# Patient Record
Sex: MALE | Race: WHITE | NOT HISPANIC OR LATINO | Employment: OTHER | URBAN - METROPOLITAN AREA
[De-identification: names, ages, dates, MRNs, and addresses within clinical notes are randomized per-mention and may not be internally consistent; named-entity substitution may affect disease eponyms.]

---

## 2023-01-31 PROBLEM — I10 ESSENTIAL HYPERTENSION: Status: ACTIVE | Noted: 2023-01-31
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
## 2023-01-31 PROBLEM — G47.00 INSOMNIA: Status: ACTIVE | Noted: 2023-01-31

## 2023-01-31 PROBLEM — J20.9 ACUTE BRONCHITIS: Status: ACTIVE | Noted: 2023-01-31

## 2023-01-31 PROBLEM — M54.16 LUMBAR RADICULITIS: Status: ACTIVE | Noted: 2023-01-31

## 2023-01-31 PROBLEM — Z95.2 S/P AVR (AORTIC VALVE REPLACEMENT): Status: ACTIVE | Noted: 2023-01-31

## 2023-01-31 PROBLEM — R32 URINE INCONTINENCE: Status: ACTIVE | Noted: 2023-01-31

## 2023-01-31 PROBLEM — I48.0 PAROXYSMAL ATRIAL FIBRILLATION (MULTI): Status: ACTIVE | Noted: 2023-01-31

## 2023-01-31 PROBLEM — M70.70 HIP BURSITIS: Status: ACTIVE | Noted: 2023-01-31

## 2023-01-31 PROBLEM — K92.1 BLOOD IN STOOL: Status: ACTIVE | Noted: 2023-01-31

## 2023-01-31 PROBLEM — R60.9 PERIPHERAL EDEMA: Status: ACTIVE | Noted: 2023-01-31

## 2023-01-31 PROBLEM — N17.9 AKI (ACUTE KIDNEY INJURY) (CMS-HCC): Status: ACTIVE | Noted: 2023-01-31

## 2023-01-31 PROBLEM — T84.018A: Status: ACTIVE | Noted: 2023-01-31

## 2023-01-31 PROBLEM — B35.1 ONYCHOMYCOSIS OF TOENAIL: Status: ACTIVE | Noted: 2023-01-31

## 2023-01-31 PROBLEM — F32.4 DEPRESSION, MAJOR, IN PARTIAL REMISSION (CMS-HCC): Status: ACTIVE | Noted: 2023-01-31

## 2023-01-31 PROBLEM — Z96.649: Status: ACTIVE | Noted: 2023-01-31

## 2023-01-31 PROBLEM — R35.1 NOCTURIA: Status: ACTIVE | Noted: 2023-01-31

## 2023-01-31 PROBLEM — L98.9 SKIN LESION OF FACE: Status: ACTIVE | Noted: 2023-01-31

## 2023-01-31 PROBLEM — R80.9 PROTEINURIA: Status: ACTIVE | Noted: 2023-01-31

## 2023-01-31 PROBLEM — R60.0 PERIPHERAL EDEMA: Status: ACTIVE | Noted: 2023-01-31

## 2023-01-31 PROBLEM — Z95.1 S/P CABG X 3: Status: ACTIVE | Noted: 2023-01-31

## 2023-01-31 PROBLEM — E78.5 HYPERLIPIDEMIA: Status: ACTIVE | Noted: 2023-01-31

## 2023-01-31 PROBLEM — F32.A DEPRESSION: Status: ACTIVE | Noted: 2023-01-31

## 2023-01-31 PROBLEM — L40.9 PSORIASIS: Status: ACTIVE | Noted: 2023-01-31

## 2023-01-31 PROBLEM — M48.061 LUMBAR SPINAL STENOSIS: Status: ACTIVE | Noted: 2023-01-31

## 2023-01-31 PROBLEM — R41.82 CHANGE IN MENTAL STATUS: Status: ACTIVE | Noted: 2023-01-31

## 2023-01-31 PROBLEM — R58 RETROPERITONEAL BLEED: Status: ACTIVE | Noted: 2023-01-31

## 2023-01-31 PROBLEM — R53.83 FATIGUE: Status: ACTIVE | Noted: 2023-01-31

## 2023-01-31 PROBLEM — F03.90 DEMENTIA (MULTI): Status: ACTIVE | Noted: 2023-01-31

## 2023-01-31 PROBLEM — B35.6 TINEA CRURIS: Status: ACTIVE | Noted: 2023-01-31

## 2023-01-31 PROBLEM — I25.10 CAD IN NATIVE ARTERY: Status: ACTIVE | Noted: 2023-01-31

## 2023-01-31 PROBLEM — M17.11 RIGHT KNEE DJD: Status: ACTIVE | Noted: 2023-01-31

## 2023-01-31 PROBLEM — M16.10 ARTHRITIS OF HIP: Status: ACTIVE | Noted: 2023-01-31

## 2023-01-31 PROBLEM — I47.10 PSVT (PAROXYSMAL SUPRAVENTRICULAR TACHYCARDIA) (CMS-HCC): Status: ACTIVE | Noted: 2023-01-31

## 2023-01-31 PROBLEM — I42.9 CARDIOMYOPATHY (MULTI): Status: ACTIVE | Noted: 2023-01-31

## 2023-01-31 RX ORDER — GABAPENTIN 600 MG/1
600 TABLET ORAL 2 TIMES DAILY
COMMUNITY
Start: 2016-09-11 | End: 2023-03-14 | Stop reason: SDUPTHER

## 2023-01-31 RX ORDER — CHLORTHALIDONE 25 MG/1
1 TABLET ORAL DAILY
COMMUNITY
Start: 2022-02-18 | End: 2023-06-19 | Stop reason: SDUPTHER

## 2023-01-31 RX ORDER — SERTRALINE HYDROCHLORIDE 100 MG/1
2 TABLET, FILM COATED ORAL DAILY
COMMUNITY
Start: 2013-01-07 | End: 2023-03-23

## 2023-01-31 RX ORDER — NYSTATIN 100000 U/G
CREAM TOPICAL
COMMUNITY
Start: 2022-08-09 | End: 2023-06-06 | Stop reason: ALTCHOICE

## 2023-01-31 RX ORDER — AMLODIPINE BESYLATE 5 MG/1
1 TABLET ORAL DAILY
COMMUNITY
Start: 2022-01-24 | End: 2023-05-01

## 2023-01-31 RX ORDER — TRAZODONE HYDROCHLORIDE 50 MG/1
1 TABLET ORAL NIGHTLY
COMMUNITY
Start: 2021-09-21 | End: 2023-04-13

## 2023-01-31 RX ORDER — TAMSULOSIN HYDROCHLORIDE 0.4 MG/1
1 CAPSULE ORAL DAILY
COMMUNITY
Start: 2022-02-18 | End: 2023-06-07

## 2023-01-31 RX ORDER — VENLAFAXINE HYDROCHLORIDE 75 MG/1
75 CAPSULE, EXTENDED RELEASE ORAL DAILY
COMMUNITY
Start: 2013-01-07 | End: 2023-11-07 | Stop reason: SDUPTHER

## 2023-01-31 RX ORDER — SOTALOL HYDROCHLORIDE 80 MG/1
1 TABLET ORAL 2 TIMES DAILY
COMMUNITY
Start: 2021-09-21 | End: 2023-11-07 | Stop reason: SDUPTHER

## 2023-01-31 RX ORDER — ACETAMINOPHEN 325 MG/1
650 TABLET ORAL EVERY 6 HOURS
COMMUNITY
Start: 2021-09-21

## 2023-01-31 RX ORDER — METOPROLOL SUCCINATE 100 MG/1
1 TABLET, EXTENDED RELEASE ORAL DAILY
COMMUNITY
Start: 2019-01-07 | End: 2023-04-13

## 2023-01-31 RX ORDER — FENOFIBRATE 145 MG/1
1 TABLET, FILM COATED ORAL DAILY
COMMUNITY
Start: 2015-02-05 | End: 2023-04-13

## 2023-01-31 RX ORDER — LISINOPRIL 40 MG/1
1 TABLET ORAL DAILY
COMMUNITY
Start: 2019-03-04 | End: 2023-03-13

## 2023-01-31 RX ORDER — WARFARIN 1 MG/1
TABLET ORAL
COMMUNITY
Start: 2014-03-31 | End: 2023-03-23

## 2023-01-31 RX ORDER — ATORVASTATIN CALCIUM 20 MG/1
1 TABLET, FILM COATED ORAL DAILY
COMMUNITY
Start: 2013-06-29 | End: 2023-04-13

## 2023-03-12 DIAGNOSIS — I10 PRIMARY HYPERTENSION: Primary | ICD-10-CM

## 2023-03-13 RX ORDER — LISINOPRIL 40 MG/1
TABLET ORAL
Qty: 90 TABLET | Refills: 3 | Status: SHIPPED | OUTPATIENT
Start: 2023-03-13 | End: 2024-02-22 | Stop reason: SDUPTHER

## 2023-03-14 ENCOUNTER — OFFICE VISIT (OUTPATIENT)
Dept: PRIMARY CARE | Facility: CLINIC | Age: 78
End: 2023-03-14
Payer: MEDICARE

## 2023-03-14 VITALS
HEIGHT: 68 IN | TEMPERATURE: 98.6 F | DIASTOLIC BLOOD PRESSURE: 74 MMHG | SYSTOLIC BLOOD PRESSURE: 144 MMHG | WEIGHT: 280 LBS | BODY MASS INDEX: 42.44 KG/M2

## 2023-03-14 DIAGNOSIS — Z91.81 AT MODERATE RISK FOR FALL: ICD-10-CM

## 2023-03-14 DIAGNOSIS — E66.01 MORBID OBESITY WITH BMI OF 40.0-44.9, ADULT (MULTI): Primary | ICD-10-CM

## 2023-03-14 DIAGNOSIS — Z00.00 ROUTINE GENERAL MEDICAL EXAMINATION AT HEALTH CARE FACILITY: ICD-10-CM

## 2023-03-14 DIAGNOSIS — M48.062 SPINAL STENOSIS OF LUMBAR REGION WITH NEUROGENIC CLAUDICATION: ICD-10-CM

## 2023-03-14 DIAGNOSIS — M54.16 LUMBAR RADICULITIS: ICD-10-CM

## 2023-03-14 PROCEDURE — 1036F TOBACCO NON-USER: CPT | Performed by: FAMILY MEDICINE

## 2023-03-14 PROCEDURE — 3077F SYST BP >= 140 MM HG: CPT | Performed by: FAMILY MEDICINE

## 2023-03-14 PROCEDURE — 1157F ADVNC CARE PLAN IN RCRD: CPT | Performed by: FAMILY MEDICINE

## 2023-03-14 PROCEDURE — 1170F FXNL STATUS ASSESSED: CPT | Performed by: FAMILY MEDICINE

## 2023-03-14 PROCEDURE — 1160F RVW MEDS BY RX/DR IN RCRD: CPT | Performed by: FAMILY MEDICINE

## 2023-03-14 PROCEDURE — 1159F MED LIST DOCD IN RCRD: CPT | Performed by: FAMILY MEDICINE

## 2023-03-14 PROCEDURE — 3078F DIAST BP <80 MM HG: CPT | Performed by: FAMILY MEDICINE

## 2023-03-14 PROCEDURE — G0439 PPPS, SUBSEQ VISIT: HCPCS | Performed by: FAMILY MEDICINE

## 2023-03-14 RX ORDER — GABAPENTIN 600 MG/1
600 TABLET ORAL 4 TIMES DAILY
Qty: 360 TABLET | Refills: 0 | Status: SHIPPED | OUTPATIENT
Start: 2023-03-14 | End: 2023-04-05 | Stop reason: SDUPTHER

## 2023-03-14 ASSESSMENT — ACTIVITIES OF DAILY LIVING (ADL)
BATHING: INDEPENDENT
DRESSING: INDEPENDENT

## 2023-03-14 ASSESSMENT — ENCOUNTER SYMPTOMS
LOSS OF SENSATION IN FEET: 0
ADENOPATHY: 0
SORE THROAT: 0
CONSTIPATION: 0
OCCASIONAL FEELINGS OF UNSTEADINESS: 1
WHEEZING: 0
DIARRHEA: 0
NUMBNESS: 0
TROUBLE SWALLOWING: 0
DYSPHORIC MOOD: 1
CONFUSION: 0
WEAKNESS: 0
ARTHRALGIAS: 1
VOMITING: 0
DIZZINESS: 0
BLOOD IN STOOL: 0
DYSURIA: 0
PALPITATIONS: 0
RHINORRHEA: 0
BACK PAIN: 1
HEADACHES: 0
HEMATURIA: 0
DEPRESSION: 0
FREQUENCY: 0
ABDOMINAL PAIN: 0
VOICE CHANGE: 0
WOUND: 0
FEVER: 0
MYALGIAS: 0
COUGH: 0
NAUSEA: 0
SINUS PAIN: 0
FATIGUE: 1
SHORTNESS OF BREATH: 0

## 2023-03-14 ASSESSMENT — PATIENT HEALTH QUESTIONNAIRE - PHQ9
10. IF YOU CHECKED OFF ANY PROBLEMS, HOW DIFFICULT HAVE THESE PROBLEMS MADE IT FOR YOU TO DO YOUR WORK, TAKE CARE OF THINGS AT HOME, OR GET ALONG WITH OTHER PEOPLE: NOT DIFFICULT AT ALL
2. FEELING DOWN, DEPRESSED OR HOPELESS: MORE THAN HALF THE DAYS
9. THOUGHTS THAT YOU WOULD BE BETTER OFF DEAD, OR OF HURTING YOURSELF: NOT AT ALL
4. FEELING TIRED OR HAVING LITTLE ENERGY: NEARLY EVERY DAY
3. TROUBLE FALLING OR STAYING ASLEEP OR SLEEPING TOO MUCH: NOT AT ALL
1. LITTLE INTEREST OR PLEASURE IN DOING THINGS: MORE THAN HALF THE DAYS
SUM OF ALL RESPONSES TO PHQ9 QUESTIONS 1 AND 2: 4
7. TROUBLE CONCENTRATING ON THINGS, SUCH AS READING THE NEWSPAPER OR WATCHING TELEVISION: NOT AT ALL
5. POOR APPETITE OR OVEREATING: MORE THAN HALF THE DAYS
8. MOVING OR SPEAKING SO SLOWLY THAT OTHER PEOPLE COULD HAVE NOTICED. OR THE OPPOSITE, BEING SO FIGETY OR RESTLESS THAT YOU HAVE BEEN MOVING AROUND A LOT MORE THAN USUAL: NOT AT ALL
6. FEELING BAD ABOUT YOURSELF - OR THAT YOU ARE A FAILURE OR HAVE LET YOURSELF OR YOUR FAMILY DOWN: MORE THAN HALF THE DAYS
SUM OF ALL RESPONSES TO PHQ QUESTIONS 1-9: 11

## 2023-03-14 NOTE — PATIENT INSTRUCTIONS
I will order PT to help improve your stability of gait to help reduce the risk of falling.  Continue your present medications. I increased the gabapentin to better help the pain you have been experiencing.  You decline a colonoscopy.  Your shots are up to date.  Weight loss would improve your health. Reduce intake of sugars and starches and try to safely increase your exercise.

## 2023-03-14 NOTE — PROGRESS NOTES
"Subjective   Reason for Visit: Silvestre Aranda is an 78 y.o. male here for a Medicare Wellness visit.     Past Medical, Surgical, and Family History reviewed and updated in chart.    Reviewed all medications by prescribing practitioner or clinical pharmacist (such as prescriptions, OTCs, herbal therapies and supplements) and documented in the medical record.    HPI    Patient Self Assessment of Health Status  Patient Self Assessment: Vikas has had the flu shot Shingrix and pneumonia shots.    Quit smoking 25 to 30 years ago.  Denies drinking alcohol.  No drugs.  Nutrition and Exercise  Current Diet: Well Balanced Diet (\"semi-healthy diet\")  Adequate Fluid Intake: Yes  Caffeine: Yes  Exercise Frequency: No Exercise    Functional Ability/Level of Safety  Cognitive Impairment Observed: No cognitive impairment observed  Cognitive Impairment Reported: No cognitive impairment reported by patient or family    Home Safety Risk Factors: None    Patient Care Team:  Lexa Garcia DO as PCP - General  Lexa Garcia DO as PCP - [employer] PCP     Review of Systems   Constitutional:  Positive for fatigue. Negative for fever.   HENT:  Negative for congestion, rhinorrhea, sinus pain, sore throat, trouble swallowing and voice change.    Respiratory:  Negative for cough, shortness of breath and wheezing.    Cardiovascular:  Negative for chest pain, palpitations and leg swelling.   Gastrointestinal:  Negative for abdominal pain, blood in stool, constipation, diarrhea, nausea and vomiting.   Genitourinary:  Negative for dysuria, frequency and hematuria.   Musculoskeletal:  Positive for arthralgias and back pain. Negative for myalgias.   Skin:  Negative for rash and wound.   Neurological:  Negative for dizziness, syncope, weakness, numbness and headaches.   Hematological:  Negative for adenopathy.   Psychiatric/Behavioral:  Positive for dysphoric mood. Negative for behavioral problems, confusion and suicidal " "ideas.        Objective   Vitals:  /74 (BP Location: Left arm, Patient Position: Sitting)   Temp 37 °C (98.6 °F)   Ht 1.727 m (5' 8\")   Wt 127 kg (280 lb)   BMI 42.57 kg/m²       Physical Exam  Constitutional:       Appearance: Normal appearance.   HENT:      Head: Normocephalic and atraumatic.   Cardiovascular:      Rate and Rhythm: Normal rate and regular rhythm.      Pulses: Normal pulses.      Heart sounds: Normal heart sounds.   Pulmonary:      Effort: Pulmonary effort is normal.      Breath sounds: Normal breath sounds.   Abdominal:      General: Abdomen is flat.      Palpations: Abdomen is soft.      Tenderness: There is no abdominal tenderness. There is no guarding or rebound.   Musculoskeletal:         General: Tenderness (lumbar tenderness) present.      Cervical back: Neck supple.   Skin:     Findings: No rash.   Neurological:      General: No focal deficit present.      Mental Status: He is alert and oriented to person, place, and time.   Psychiatric:      Comments: Depressed affect. No suicidal ideation         Assessment/Plan   Problem List Items Addressed This Visit          Nervous    Lumbar radiculitis    Relevant Medications    gabapentin (Neurontin) 600 mg tablet    Lumbar spinal stenosis    Relevant Medications    gabapentin (Neurontin) 600 mg tablet     Other Visit Diagnoses       Morbid obesity with BMI of 40.0-44.9, adult (CMS/[facility])    -  Primary    Relevant Orders    Referral to Weight Management - Meal Replacement    Routine general medical examination at health care facility        At moderate risk for fall            I will order PT to help improve your stability of gait to help reduce the risk of falling.  Continue your present medications. I increased the gabapentin to better help the pain you have been experiencing.  You decline a colonoscopy.  Your shots are up to date.  Weight loss would improve your health. Reduce intake of sugars and starches and try to safely increase your " exercise.

## 2023-03-22 DIAGNOSIS — I48.0 PAROXYSMAL ATRIAL FIBRILLATION (MULTI): Primary | ICD-10-CM

## 2023-03-22 DIAGNOSIS — F32.A DEPRESSION, UNSPECIFIED: ICD-10-CM

## 2023-03-23 RX ORDER — SERTRALINE HYDROCHLORIDE 100 MG/1
TABLET, FILM COATED ORAL
Qty: 60 TABLET | Refills: 2 | Status: SHIPPED | OUTPATIENT
Start: 2023-03-23 | End: 2023-05-22

## 2023-03-23 RX ORDER — WARFARIN 1 MG/1
TABLET ORAL
Qty: 180 TABLET | Refills: 3 | Status: SHIPPED | OUTPATIENT
Start: 2023-03-23 | End: 2023-11-20

## 2023-04-03 ENCOUNTER — TELEPHONE (OUTPATIENT)
Dept: PRIMARY CARE | Facility: CLINIC | Age: 78
End: 2023-04-03
Payer: MEDICARE

## 2023-04-03 NOTE — TELEPHONE ENCOUNTER
Pt is calling in regards to his Gabapentin 600 MG. He said at his last visit you guys discussed changing it to 2 tablets in the morning and then 4 tablets at bedtime. He said when he picked up his medication it was just changed to take 3 tablets daily. He said he is still in pain when he takes it that way. He is asking if you can give him a call to discuss if he can take it as 2 tablets in the morning and 4 tablets at bedtime?

## 2023-04-05 DIAGNOSIS — M54.16 LUMBAR RADICULITIS: ICD-10-CM

## 2023-04-05 DIAGNOSIS — M48.062 SPINAL STENOSIS OF LUMBAR REGION WITH NEUROGENIC CLAUDICATION: ICD-10-CM

## 2023-04-05 RX ORDER — GABAPENTIN 600 MG/1
TABLET ORAL
Qty: 540 TABLET | Refills: 0 | Status: SHIPPED | OUTPATIENT
Start: 2023-04-05 | End: 2023-06-12

## 2023-04-13 DIAGNOSIS — I10 ESSENTIAL HYPERTENSION: ICD-10-CM

## 2023-04-13 DIAGNOSIS — E78.5 HYPERLIPIDEMIA, UNSPECIFIED HYPERLIPIDEMIA TYPE: Primary | ICD-10-CM

## 2023-04-13 DIAGNOSIS — G47.00 INSOMNIA, UNSPECIFIED TYPE: ICD-10-CM

## 2023-04-13 RX ORDER — FENOFIBRATE 145 MG/1
TABLET, FILM COATED ORAL
Qty: 90 TABLET | Refills: 3 | Status: SHIPPED | OUTPATIENT
Start: 2023-04-13 | End: 2024-02-22 | Stop reason: SDUPTHER

## 2023-04-13 RX ORDER — METOPROLOL SUCCINATE 100 MG/1
TABLET, EXTENDED RELEASE ORAL
Qty: 90 TABLET | Refills: 3 | Status: SHIPPED | OUTPATIENT
Start: 2023-04-13 | End: 2024-02-22 | Stop reason: SDUPTHER

## 2023-04-13 RX ORDER — TRAZODONE HYDROCHLORIDE 50 MG/1
TABLET ORAL
Qty: 90 TABLET | Refills: 3 | Status: SHIPPED | OUTPATIENT
Start: 2023-04-13 | End: 2023-06-13 | Stop reason: SINTOL

## 2023-04-13 RX ORDER — ATORVASTATIN CALCIUM 20 MG/1
TABLET, FILM COATED ORAL
Qty: 90 TABLET | Refills: 3 | Status: SHIPPED | OUTPATIENT
Start: 2023-04-13 | End: 2024-02-22 | Stop reason: SDUPTHER

## 2023-04-30 DIAGNOSIS — I10 ESSENTIAL HYPERTENSION: Primary | ICD-10-CM

## 2023-05-01 RX ORDER — AMLODIPINE BESYLATE 5 MG/1
TABLET ORAL
Qty: 90 TABLET | Refills: 3 | Status: SHIPPED | OUTPATIENT
Start: 2023-05-01 | End: 2023-07-20 | Stop reason: SDUPTHER

## 2023-05-15 ENCOUNTER — TELEPHONE (OUTPATIENT)
Dept: PRIMARY CARE | Facility: CLINIC | Age: 78
End: 2023-05-15
Payer: MEDICARE

## 2023-05-21 DIAGNOSIS — F32.A DEPRESSION, UNSPECIFIED: ICD-10-CM

## 2023-05-22 RX ORDER — SERTRALINE HYDROCHLORIDE 100 MG/1
TABLET, FILM COATED ORAL
Qty: 180 TABLET | Refills: 3 | Status: SHIPPED | OUTPATIENT
Start: 2023-05-22 | End: 2023-06-13 | Stop reason: SINTOL

## 2023-06-06 ENCOUNTER — OFFICE VISIT (OUTPATIENT)
Dept: PRIMARY CARE | Facility: CLINIC | Age: 78
End: 2023-06-06
Payer: MEDICARE

## 2023-06-06 VITALS
WEIGHT: 279 LBS | TEMPERATURE: 98.1 F | SYSTOLIC BLOOD PRESSURE: 110 MMHG | DIASTOLIC BLOOD PRESSURE: 70 MMHG | BODY MASS INDEX: 42.42 KG/M2

## 2023-06-06 DIAGNOSIS — R73.9 HYPERGLYCEMIA: ICD-10-CM

## 2023-06-06 DIAGNOSIS — N40.0 BENIGN PROSTATIC HYPERPLASIA, UNSPECIFIED WHETHER LOWER URINARY TRACT SYMPTOMS PRESENT: Primary | ICD-10-CM

## 2023-06-06 DIAGNOSIS — I47.10 SUPRAVENTRICULAR TACHYCARDIA (CMS-HCC): ICD-10-CM

## 2023-06-06 DIAGNOSIS — I42.9 CARDIOMYOPATHY, UNSPECIFIED TYPE (MULTI): ICD-10-CM

## 2023-06-06 DIAGNOSIS — F33.41 RECURRENT MAJOR DEPRESSIVE DISORDER, IN PARTIAL REMISSION (CMS-HCC): ICD-10-CM

## 2023-06-06 DIAGNOSIS — N40.0 BENIGN PROSTATIC HYPERPLASIA, UNSPECIFIED WHETHER LOWER URINARY TRACT SYMPTOMS PRESENT: ICD-10-CM

## 2023-06-06 DIAGNOSIS — I10 PRIMARY HYPERTENSION: Primary | ICD-10-CM

## 2023-06-06 PROBLEM — F03.90 DEMENTIA (MULTI): Status: RESOLVED | Noted: 2023-01-31 | Resolved: 2023-06-06

## 2023-06-06 PROCEDURE — 1157F ADVNC CARE PLAN IN RCRD: CPT | Performed by: FAMILY MEDICINE

## 2023-06-06 PROCEDURE — 1036F TOBACCO NON-USER: CPT | Performed by: FAMILY MEDICINE

## 2023-06-06 PROCEDURE — 1159F MED LIST DOCD IN RCRD: CPT | Performed by: FAMILY MEDICINE

## 2023-06-06 PROCEDURE — 3074F SYST BP LT 130 MM HG: CPT | Performed by: FAMILY MEDICINE

## 2023-06-06 PROCEDURE — 99214 OFFICE O/P EST MOD 30 MIN: CPT | Performed by: FAMILY MEDICINE

## 2023-06-06 PROCEDURE — 1160F RVW MEDS BY RX/DR IN RCRD: CPT | Performed by: FAMILY MEDICINE

## 2023-06-06 PROCEDURE — 3078F DIAST BP <80 MM HG: CPT | Performed by: FAMILY MEDICINE

## 2023-06-06 ASSESSMENT — PATIENT HEALTH QUESTIONNAIRE - PHQ9
1. LITTLE INTEREST OR PLEASURE IN DOING THINGS: NOT AT ALL
2. FEELING DOWN, DEPRESSED OR HOPELESS: NOT AT ALL
SUM OF ALL RESPONSES TO PHQ9 QUESTIONS 1 AND 2: 0

## 2023-06-06 ASSESSMENT — ENCOUNTER SYMPTOMS: DEPRESSION: 0

## 2023-06-06 NOTE — PROGRESS NOTES
Subjective   Patient ID: [id number]     Silvestre Aranda is a 78 y.o. male who presents for Follow-up.  HPI  He is here for a recheck.  He has HTN, aortic valve surgery (on coumadin chronically) DJD, neuropathy and depression.      No chest pain, dizziness, sob.      He quit smoking 20 some years ago.    He has had arthritis.  His back pain has worsened lately.  It has been bad over the last three or four weeks.    No falls since the last visit.  He has a cane with him.     No problems with confusion or memory decline reported by patient.     Objective     /70 (BP Location: Right arm, Patient Position: Sitting)   Temp 36.7 °C (98.1 °F)   Wt 127 kg (279 lb)   BMI 42.42 kg/m²      Physical Exam  Constitutional:       Appearance: Normal appearance.   Cardiovascular:      Rate and Rhythm: Normal rate and regular rhythm.      Heart sounds: Normal heart sounds. No murmur heard.  Pulmonary:      Effort: Pulmonary effort is normal. No respiratory distress.      Breath sounds: Normal breath sounds. No wheezing.   Abdominal:      General: Abdomen is flat.      Palpations: Abdomen is soft.      Tenderness: There is no abdominal tenderness.   Musculoskeletal:      Right lower leg: Edema present.      Left lower leg: Edema present.      Comments: Chronic edema.  Suspected from amlodipine.   Neurological:      Mental Status: He is alert.   Psychiatric:      Comments: Affect is depressed. Pt states depression is well controlled.    Denies any suicidal thoughts.           Assessment/Plan   Problem List Items Addressed This Visit          Circulatory    Cardiomyopathy (CMS/HCC)       Other    Depression     Other Visit Diagnoses       Primary hypertension    -  Primary    Relevant Orders    Urinalysis with Reflex Microscopic    Hemoglobin A1C    Lipid Panel    Thyroid Stimulating Hormone    Comprehensive Metabolic Panel    CBC and Auto Differential    Supraventricular tachycardia (CMS/HCC)        Benign prostatic hyperplasia,  unspecified whether lower urinary tract symptoms present        Relevant Orders    Prostate Specific Antigen    Hyperglycemia        Relevant Orders    Hemoglobin A1C        Continue the same medication.  I ordered labs to be done fasting.  Weight loss is recommended.  I recommend proceeding with PT as previously recommended to help decrease fall risk and help stability.      Lexa Garcia, DO

## 2023-06-06 NOTE — PATIENT INSTRUCTIONS
Continue the same medication.  I ordered labs to be done fasting.  Weight loss is recommended.  I recommend proceeding with PT as previously recommended to help decrease fall risk and help stability.

## 2023-06-07 RX ORDER — TAMSULOSIN HYDROCHLORIDE 0.4 MG/1
CAPSULE ORAL
Qty: 90 CAPSULE | Refills: 3 | Status: SHIPPED | OUTPATIENT
Start: 2023-06-07 | End: 2023-06-09

## 2023-06-09 DIAGNOSIS — N40.0 BENIGN PROSTATIC HYPERPLASIA, UNSPECIFIED WHETHER LOWER URINARY TRACT SYMPTOMS PRESENT: ICD-10-CM

## 2023-06-09 RX ORDER — TAMSULOSIN HYDROCHLORIDE 0.4 MG/1
CAPSULE ORAL
Qty: 90 CAPSULE | Refills: 3 | Status: SHIPPED | OUTPATIENT
Start: 2023-06-09 | End: 2023-07-20 | Stop reason: SDUPTHER

## 2023-06-11 DIAGNOSIS — M54.16 LUMBAR RADICULITIS: ICD-10-CM

## 2023-06-11 DIAGNOSIS — M48.062 SPINAL STENOSIS OF LUMBAR REGION WITH NEUROGENIC CLAUDICATION: ICD-10-CM

## 2023-06-12 ENCOUNTER — PATIENT OUTREACH (OUTPATIENT)
Dept: CARE COORDINATION | Facility: CLINIC | Age: 78
End: 2023-06-12
Payer: MEDICARE

## 2023-06-12 DIAGNOSIS — R41.82 ALTERED MENTAL STATUS, UNSPECIFIED ALTERED MENTAL STATUS TYPE: ICD-10-CM

## 2023-06-12 RX ORDER — GABAPENTIN 600 MG/1
TABLET ORAL
Qty: 540 TABLET | Refills: 3 | Status: SHIPPED | OUTPATIENT
Start: 2023-06-12 | End: 2023-06-13 | Stop reason: SINTOL

## 2023-06-12 NOTE — PROGRESS NOTES
Discharge Facility:[facility]  Discharge Diagnosis:R41.82 Altered mental status  Admission Date:6/9/23  Discharge Date: 6/11/23    PCP Appointment Date:6/13/23  Specialist Appointment Date:   Hospital Encounter and Summary: not available at this time

## 2023-06-13 ENCOUNTER — OFFICE VISIT (OUTPATIENT)
Dept: PRIMARY CARE | Facility: CLINIC | Age: 78
End: 2023-06-13
Payer: MEDICARE

## 2023-06-13 VITALS
SYSTOLIC BLOOD PRESSURE: 142 MMHG | DIASTOLIC BLOOD PRESSURE: 80 MMHG | WEIGHT: 264 LBS | BODY MASS INDEX: 40.14 KG/M2 | TEMPERATURE: 98.3 F

## 2023-06-13 DIAGNOSIS — Z79.899 POLYPHARMACY: ICD-10-CM

## 2023-06-13 DIAGNOSIS — R41.82 ACUTE ALTERATION IN MENTAL STATUS: Primary | ICD-10-CM

## 2023-06-13 DIAGNOSIS — I48.0 PAROXYSMAL ATRIAL FIBRILLATION (MULTI): ICD-10-CM

## 2023-06-13 LAB
POC INR: 2.2 (ref 0.9–1.1)
POC PROTHROMBIN TIME: 26.3 (ref 9.3–12.5)

## 2023-06-13 PROCEDURE — 85610 PROTHROMBIN TIME: CPT | Performed by: FAMILY MEDICINE

## 2023-06-13 PROCEDURE — 1157F ADVNC CARE PLAN IN RCRD: CPT | Performed by: FAMILY MEDICINE

## 2023-06-13 PROCEDURE — 3077F SYST BP >= 140 MM HG: CPT | Performed by: FAMILY MEDICINE

## 2023-06-13 PROCEDURE — 1036F TOBACCO NON-USER: CPT | Performed by: FAMILY MEDICINE

## 2023-06-13 PROCEDURE — 1159F MED LIST DOCD IN RCRD: CPT | Performed by: FAMILY MEDICINE

## 2023-06-13 PROCEDURE — 99214 OFFICE O/P EST MOD 30 MIN: CPT | Performed by: FAMILY MEDICINE

## 2023-06-13 PROCEDURE — 3079F DIAST BP 80-89 MM HG: CPT | Performed by: FAMILY MEDICINE

## 2023-06-13 PROCEDURE — 1160F RVW MEDS BY RX/DR IN RCRD: CPT | Performed by: FAMILY MEDICINE

## 2023-06-13 RX ORDER — HYDROCORTISONE 25 MG/G
CREAM TOPICAL
COMMUNITY
End: 2024-03-07 | Stop reason: WASHOUT

## 2023-06-13 ASSESSMENT — PATIENT HEALTH QUESTIONNAIRE - PHQ9
2. FEELING DOWN, DEPRESSED OR HOPELESS: NOT AT ALL
1. LITTLE INTEREST OR PLEASURE IN DOING THINGS: NOT AT ALL
SUM OF ALL RESPONSES TO PHQ9 QUESTIONS 1 AND 2: 0

## 2023-06-13 ASSESSMENT — ENCOUNTER SYMPTOMS: DEPRESSION: 0

## 2023-06-13 NOTE — PATIENT INSTRUCTIONS
You seem to be your normal self today but you were very confused when you were in the hospital over the weekend. You should stay off the gabapentin, sertraline and the trazodone.  You may continue the effexor.  You were seen by a psychiatrist, Dr Pacheco.  You should call him for an apppointment at [phone].    Your INR today is back up to normal at 2.2.  Continue the coumadin at 3 mg on Monday and Thursday and 2 mg on all other days of the week.  Recheck your INR at home in one week.    Call today for an appointment with your psychiatrist.  I recommend that you make a return appointment here in two weeks for a recheck.

## 2023-06-13 NOTE — PROGRESS NOTES
Subjective   Patient ID: [id number]     Silvestre Aranda is a 78 y.o. male who presents for Hospital Follow-up.  HPI  He is here for a follow up visit after being in the hospital from 6/9/23 to 6/11/23.  The DC summary from 6/11/23 was reviewed today.  He was admitted with mental status changes thought to be from polypharmacy.  He was oriented to name only.   Tox/alcohol/drug screen neg.      His doses were adjusted.  He was seen by psychiatry and his medications were adjusted.      He was diagnosed with polypharmacy.  His sertraline, trazodone and gabapentin were stopped.  He is continued on venlafaxine.  His INR was subtherapeutic.  He was continued on warfarin 1 mg daily.      CT head and neck were reviewed today.  He has sustained a fall.  Xray of the right shoulder showed moderate DJD.  CXR was unremarkable.    He states he remembers going in.  He was brought in by EMS.  He states he was having diarrhea and slipped getting to the bathroom.  He states he was confused.  He states he could not remember the year.      He has a very large bruise on his right shoulder.  He states he said he did not know what happened, whether he fell or tripped.      Objective     /80 (BP Location: Left arm, Patient Position: Sitting)   Temp 36.8 °C (98.3 °F)   Wt 120 kg (264 lb)   BMI 40.14 kg/m²      Physical Exam  Constitutional:       General: He is not in acute distress.     Appearance: Normal appearance. He is not ill-appearing, toxic-appearing or diaphoretic.   HENT:      Head: Atraumatic.   Cardiovascular:      Rate and Rhythm: Normal rate.      Heart sounds: Normal heart sounds.      Comments: Irregular rhythm.  Pulmonary:      Effort: Pulmonary effort is normal.      Breath sounds: Normal breath sounds.   Abdominal:      General: Abdomen is flat.      Palpations: Abdomen is soft.   Musculoskeletal:         General: Normal range of motion.      Cervical back: Normal range of motion.      Comments: Right shoulder ROM  normal.   Skin:     Comments: Large bruise over his right shoulder.   Neurological:      General: No focal deficit present.      Mental Status: He is alert and oriented to person, place, and time. Mental status is at baseline.      Gait: Gait abnormal (stable with use of cane.).   Psychiatric:         Mood and Affect: Mood normal.         Behavior: Behavior normal.         Thought Content: Thought content normal.         Assessment/Plan   Problem List Items Addressed This Visit          Circulatory    Paroxysmal atrial fibrillation (CMS/HCC)    Relevant Orders    POCT INR manually resulted (Completed)     Other Visit Diagnoses       Acute alteration in mental status    -  Primary    Polypharmacy            You seem to be your normal self today but you were very confused when you were in the hospital over the weekend. You should stay off the gabapentin, sertraline and the trazodone.  You may continue the effexor.  You were seen by a psychiatrist, Dr Pacheco.  You should call him for an apppointment at [phone].    Your INR today is back up to normal at 2.2.  Continue the coumadin at 3 mg on Monday and Thursday and 2 mg on all other days of the week.  Recheck your INR at home in one week.    Call today for an appointment with your psychiatrist.  I recommend that you make a return appointment here in two weeks for a recheck.      Lexa Garcia, DO

## 2023-06-16 ENCOUNTER — TELEPHONE (OUTPATIENT)
Dept: PRIMARY CARE | Facility: CLINIC | Age: 78
End: 2023-06-16
Payer: MEDICARE

## 2023-06-16 NOTE — TELEPHONE ENCOUNTER
Pt is confused as to why you told him to follow up with a Dr. Claire. He has never seen him before. Please advise

## 2023-06-19 DIAGNOSIS — I10 ESSENTIAL HYPERTENSION: Primary | ICD-10-CM

## 2023-06-19 RX ORDER — CHLORTHALIDONE 25 MG/1
25 TABLET ORAL DAILY
Qty: 90 TABLET | Refills: 1 | Status: SHIPPED | OUTPATIENT
Start: 2023-06-19 | End: 2023-11-07 | Stop reason: SDUPTHER

## 2023-06-19 NOTE — TELEPHONE ENCOUNTER
Refill Chlorthalidone 25mg 1qd      Pharmacy: [facility] [place]     LR: 05/12/22  LV: 05/12/22  NV: 06/29/23

## 2023-06-29 ENCOUNTER — OFFICE VISIT (OUTPATIENT)
Dept: PRIMARY CARE | Facility: CLINIC | Age: 78
End: 2023-06-29
Payer: MEDICARE

## 2023-06-29 VITALS
WEIGHT: 264 LBS | SYSTOLIC BLOOD PRESSURE: 128 MMHG | DIASTOLIC BLOOD PRESSURE: 80 MMHG | TEMPERATURE: 97.9 F | BODY MASS INDEX: 40.14 KG/M2

## 2023-06-29 DIAGNOSIS — Z95.2 S/P AVR (AORTIC VALVE REPLACEMENT): ICD-10-CM

## 2023-06-29 DIAGNOSIS — Z79.899 POLYPHARMACY: ICD-10-CM

## 2023-06-29 DIAGNOSIS — F33.1 MODERATE EPISODE OF RECURRENT MAJOR DEPRESSIVE DISORDER (MULTI): Primary | ICD-10-CM

## 2023-06-29 LAB
POC INR: 3 (ref 0.9–1.1)
POC PROTHROMBIN TIME: 35.7 (ref 9.3–12.5)

## 2023-06-29 PROCEDURE — 3079F DIAST BP 80-89 MM HG: CPT | Performed by: FAMILY MEDICINE

## 2023-06-29 PROCEDURE — 1157F ADVNC CARE PLAN IN RCRD: CPT | Performed by: FAMILY MEDICINE

## 2023-06-29 PROCEDURE — 1160F RVW MEDS BY RX/DR IN RCRD: CPT | Performed by: FAMILY MEDICINE

## 2023-06-29 PROCEDURE — 1159F MED LIST DOCD IN RCRD: CPT | Performed by: FAMILY MEDICINE

## 2023-06-29 PROCEDURE — 85610 PROTHROMBIN TIME: CPT | Performed by: FAMILY MEDICINE

## 2023-06-29 PROCEDURE — 99214 OFFICE O/P EST MOD 30 MIN: CPT | Performed by: FAMILY MEDICINE

## 2023-06-29 PROCEDURE — 3074F SYST BP LT 130 MM HG: CPT | Performed by: FAMILY MEDICINE

## 2023-06-29 PROCEDURE — 1036F TOBACCO NON-USER: CPT | Performed by: FAMILY MEDICINE

## 2023-06-29 ASSESSMENT — ENCOUNTER SYMPTOMS: DEPRESSION: 0

## 2023-06-29 NOTE — PROGRESS NOTES
"Subjective   Patient ID: [id number]     Silvestre Aranda is a 78 y.o. male who presents for Follow-up (2 week) and Depression.  HPI  He is here for a recheck after being seen two weeks ago following a hospitalization for polypharmacy.    His jsertraline, trazodone and gabapentin were stopped in the hospital.  He was continued on effexor.      He has complains of depression.  He was set up to see Dr Pacheco, psychiatry.  He had lost the contact for psychiatry.    He states that physically, he feels better.  His mind is \"less numb\" off the medication    He states that his depression is getting worse.  He has not had suicidal thoughts.  He is \"falling back into depression\".      No confusion.  Thinking pretty clearly.  He feels better off the medication except for his depression.      Objective     /80 (BP Location: Right arm, Patient Position: Sitting)   Temp 36.6 °C (97.9 °F)   Wt 120 kg (264 lb)   BMI 40.14 kg/m²      Physical Exam  Constitutional:       Appearance: Normal appearance.   Cardiovascular:      Rate and Rhythm: Normal rate and regular rhythm.      Pulses: Normal pulses.      Heart sounds: Normal heart sounds.   Pulmonary:      Effort: Pulmonary effort is normal. No respiratory distress.      Breath sounds: Normal breath sounds.   Musculoskeletal:      Comments: Rom normal at right shoulder.  In PT.  Helping.   Skin:     Comments: Lessened ecchymosis at right shoulder compared to last visit.     Neurological:      Mental Status: He is alert.   Psychiatric:      Comments: No SI.  Depressed affect.  No thoughts of hurting anybody else.           Assessment/Plan   Problem List Items Addressed This Visit          Cardiac and Vasculature    S/P AVR (aortic valve replacement)    Relevant Orders    POCT INR manually resulted (Completed)       Mental Health    Depression - Primary    Relevant Orders    Referral to Psychiatry     Other Visit Diagnoses       Polypharmacy        Relevant Orders    Referral to " Psychiatry        I suggested increasing your effexor, but ideally this would come from your psychiatrist that adjusted your medication in the hospital   I gave the contact information and referral to Dr Pacheco.  Call for an appointment.  You would like to wait to see psychiatry before changing medication at this point.      Your INR was the high end of therapeutic.  Your dose is 2 mg every day except 3 mg on Monday and Thursday.  I recommend continuing the same dose.  Recheck your INR in one week to recheck this.  You already took your high dose (3 mg) this morning.          Lexa Garcia, DO

## 2023-06-29 NOTE — PATIENT INSTRUCTIONS
I suggested increasing your effexor, but ideally this would come from your psychiatrist that adjusted your medication in the hospital   I gave the contact information and referral to Dr Pacheco.  Call for an appointment.  You would like to wait to see psychiatry before changing medication at this point.      Your INR was the high end of therapeutic.  Your dose is 2 mg every day except 3 mg on Monday and Thursday.  I recommend continuing the same dose.  Recheck your INR in one week to recheck this.  You already took your high dose (3 mg) this morning.

## 2023-07-12 ENCOUNTER — PATIENT OUTREACH (OUTPATIENT)
Dept: CARE COORDINATION | Facility: CLINIC | Age: 78
End: 2023-07-12
Payer: MEDICARE

## 2023-07-12 NOTE — PROGRESS NOTES
Call regarding appt. with PCP on 6/13/23 after hospitalization.  At time of outreach call the patient feels as if their condition has improved since last visit.  Reviewed the PCP appointment with the pt and addressed any questions or concerns.

## 2023-07-18 ENCOUNTER — APPOINTMENT (OUTPATIENT)
Dept: PRIMARY CARE | Facility: CLINIC | Age: 78
End: 2023-07-18
Payer: MEDICARE

## 2023-07-18 ENCOUNTER — OFFICE VISIT (OUTPATIENT)
Dept: PRIMARY CARE | Facility: CLINIC | Age: 78
End: 2023-07-18
Payer: MEDICARE

## 2023-07-18 VITALS
SYSTOLIC BLOOD PRESSURE: 104 MMHG | BODY MASS INDEX: 39.84 KG/M2 | DIASTOLIC BLOOD PRESSURE: 64 MMHG | TEMPERATURE: 98.6 F | WEIGHT: 262 LBS

## 2023-07-18 DIAGNOSIS — K40.90 RIGHT INGUINAL HERNIA: ICD-10-CM

## 2023-07-18 DIAGNOSIS — R19.7 DIARRHEA, UNSPECIFIED TYPE: ICD-10-CM

## 2023-07-18 DIAGNOSIS — R10.84 GENERALIZED ABDOMINAL PAIN: Primary | ICD-10-CM

## 2023-07-18 PROCEDURE — 1160F RVW MEDS BY RX/DR IN RCRD: CPT | Performed by: FAMILY MEDICINE

## 2023-07-18 PROCEDURE — 3078F DIAST BP <80 MM HG: CPT | Performed by: FAMILY MEDICINE

## 2023-07-18 PROCEDURE — 1157F ADVNC CARE PLAN IN RCRD: CPT | Performed by: FAMILY MEDICINE

## 2023-07-18 PROCEDURE — 1159F MED LIST DOCD IN RCRD: CPT | Performed by: FAMILY MEDICINE

## 2023-07-18 PROCEDURE — 1036F TOBACCO NON-USER: CPT | Performed by: FAMILY MEDICINE

## 2023-07-18 PROCEDURE — 3074F SYST BP LT 130 MM HG: CPT | Performed by: FAMILY MEDICINE

## 2023-07-18 PROCEDURE — 99214 OFFICE O/P EST MOD 30 MIN: CPT | Performed by: FAMILY MEDICINE

## 2023-07-18 ASSESSMENT — ENCOUNTER SYMPTOMS: DEPRESSION: 0

## 2023-07-18 NOTE — PATIENT INSTRUCTIONS
I will order a CT and labs.  Return in one or two weeks for a recheck after the CT.  Go to the ER if this acutely worsens.  Return after the CT scan is done.

## 2023-07-18 NOTE — PROGRESS NOTES
Subjective   Patient ID: [id number]     Silvestre Aranda is a 78 y.o. male who presents for Follow-up, Abdominal Pain, Abdominal Cramping, Cough (Severe coughs cause involuntary bowel movements.), and Med Refill (Refills needed for Tamsulosin and [facility] [address]  HPI  He complains of abdominal cramping and loose stools.  He complains of a cough and some strong coughs lead to bowel leakage.      This has been going on and worsening over the last month.  It is in the epigastric region and spreads down to the lower abdomen and to the testicles bilaterally.    He has had dark green stools.  The stool started watery with this and is getting more solid.  No blood in the stool.  No fever.  Only an occasional cough. Notices leaking with the cough once in a while.      He is seeing a psychiatry via telehealth. First appointment is Friday.            Objective     /64 (BP Location: Left arm, Patient Position: Sitting)   Temp 37 °C (98.6 °F)   Wt 119 kg (262 lb)   BMI 39.84 kg/m²      Physical Exam  Constitutional:       Appearance: Normal appearance.   Cardiovascular:      Rate and Rhythm: Normal rate and regular rhythm.      Heart sounds: Normal heart sounds.   Pulmonary:      Effort: Pulmonary effort is normal.      Breath sounds: Normal breath sounds.   Abdominal:      General: Abdomen is flat. Bowel sounds are normal.      Palpations: Abdomen is soft.      Tenderness: There is abdominal tenderness (generalized tenderness.). There is no rebound.      Hernia: A hernia (large right inguinal hernia.  nontender.  It is pressurized and likely contains a bowel loop.) is present.   Neurological:      Mental Status: He is alert and oriented to person, place, and time. Mental status is at baseline.      Gait: Gait abnormal (uses a cane).         Assessment/Plan   Problem List Items Addressed This Visit    None  Visit Diagnoses       Generalized abdominal pain    -  Primary    Relevant Orders    CBC and Auto  Differential    Comprehensive metabolic panel    Amylase    Lipase    Urine Culture    Urinalysis with Reflex Microscopic    CT abdomen pelvis w IV contrast    Diarrhea, unspecified type        Relevant Orders    CBC and Auto Differential    Comprehensive metabolic panel    Amylase    Lipase    Urine Culture    Urinalysis with Reflex Microscopic    CT abdomen pelvis w IV contrast    Right inguinal hernia        Relevant Orders    CBC and Auto Differential    Comprehensive metabolic panel    Amylase    Lipase    Urine Culture    Urinalysis with Reflex Microscopic    CT abdomen pelvis w IV contrast        I will order a CT and labs.  Return in one or two weeks for a recheck after the CT.  Go to the ER if this acutely worsens.  Return after the CT scan is done.      Lexa Garcia,

## 2023-07-19 ENCOUNTER — TELEPHONE (OUTPATIENT)
Dept: PRIMARY CARE | Facility: CLINIC | Age: 78
End: 2023-07-19
Payer: MEDICARE

## 2023-07-19 DIAGNOSIS — N40.0 BENIGN PROSTATIC HYPERPLASIA, UNSPECIFIED WHETHER LOWER URINARY TRACT SYMPTOMS PRESENT: ICD-10-CM

## 2023-07-19 DIAGNOSIS — I10 ESSENTIAL HYPERTENSION: ICD-10-CM

## 2023-07-19 NOTE — TELEPHONE ENCOUNTER
PT NEEDS A 30 DAY SUPPLY FROM COVERING DR. THIS MORNING IS DR. GAUTHIER     REFILL REQUEST    1) Amlodipine (5 mg - one tab daily)  2) Tamsulosin (0.4 mg - one cap daily)    Pharmacy: [facility]  Pharmacy Address: [id number] [address]    The last refill was sent to the wrong pharm

## 2023-07-20 ENCOUNTER — TELEPHONE (OUTPATIENT)
Dept: PRIMARY CARE | Facility: CLINIC | Age: 78
End: 2023-07-20
Payer: MEDICARE

## 2023-07-20 DIAGNOSIS — N40.0 BENIGN PROSTATIC HYPERPLASIA, UNSPECIFIED WHETHER LOWER URINARY TRACT SYMPTOMS PRESENT: ICD-10-CM

## 2023-07-20 DIAGNOSIS — I10 ESSENTIAL HYPERTENSION: ICD-10-CM

## 2023-07-20 RX ORDER — AMLODIPINE BESYLATE 5 MG/1
5 TABLET ORAL DAILY
Qty: 90 TABLET | Refills: 3 | Status: SHIPPED | OUTPATIENT
Start: 2023-07-20 | End: 2023-07-20 | Stop reason: SDUPTHER

## 2023-07-20 RX ORDER — TAMSULOSIN HYDROCHLORIDE 0.4 MG/1
0.4 CAPSULE ORAL DAILY
Qty: 90 CAPSULE | Refills: 3 | Status: SHIPPED | OUTPATIENT
Start: 2023-07-20 | End: 2023-07-20 | Stop reason: SDUPTHER

## 2023-07-20 RX ORDER — AMLODIPINE BESYLATE 5 MG/1
5 TABLET ORAL DAILY
Qty: 90 TABLET | Refills: 1 | Status: SHIPPED | OUTPATIENT
Start: 2023-07-20 | End: 2023-10-13 | Stop reason: SDUPTHER

## 2023-07-20 RX ORDER — TAMSULOSIN HYDROCHLORIDE 0.4 MG/1
0.4 CAPSULE ORAL DAILY
Qty: 90 CAPSULE | Refills: 1 | Status: SHIPPED | OUTPATIENT
Start: 2023-07-20 | End: 2023-10-13 | Stop reason: SDUPTHER

## 2023-07-28 ENCOUNTER — PATIENT OUTREACH (OUTPATIENT)
Dept: CARE COORDINATION | Facility: CLINIC | Age: 78
End: 2023-07-28

## 2023-07-28 ENCOUNTER — OFFICE VISIT (OUTPATIENT)
Dept: PRIMARY CARE | Facility: CLINIC | Age: 78
End: 2023-07-28
Payer: MEDICARE

## 2023-07-28 VITALS
WEIGHT: 262 LBS | BODY MASS INDEX: 39.84 KG/M2 | SYSTOLIC BLOOD PRESSURE: 130 MMHG | TEMPERATURE: 98.4 F | DIASTOLIC BLOOD PRESSURE: 70 MMHG

## 2023-07-28 DIAGNOSIS — E78.5 HYPERLIPIDEMIA, UNSPECIFIED HYPERLIPIDEMIA TYPE: ICD-10-CM

## 2023-07-28 DIAGNOSIS — I10 ESSENTIAL HYPERTENSION: ICD-10-CM

## 2023-07-28 DIAGNOSIS — I25.10 CAD IN NATIVE ARTERY: ICD-10-CM

## 2023-07-28 DIAGNOSIS — K40.90 RIGHT INGUINAL HERNIA: ICD-10-CM

## 2023-07-28 DIAGNOSIS — R10.84 GENERALIZED ABDOMINAL PAIN: ICD-10-CM

## 2023-07-28 DIAGNOSIS — Z95.2 S/P AORTIC VALVE REPLACEMENT: Primary | ICD-10-CM

## 2023-07-28 LAB
ALANINE AMINOTRANSFERASE (SGPT) (U/L) IN SER/PLAS: 16 U/L (ref 10–52)
ALBUMIN (G/DL) IN SER/PLAS: 4.1 G/DL (ref 3.4–5)
ALKALINE PHOSPHATASE (U/L) IN SER/PLAS: 40 U/L (ref 33–136)
ANION GAP IN SER/PLAS: 11 MMOL/L (ref 10–20)
APPEARANCE, URINE: CLEAR
ASPARTATE AMINOTRANSFERASE (SGOT) (U/L) IN SER/PLAS: 19 U/L (ref 9–39)
BASOPHILS (10*3/UL) IN BLOOD BY AUTOMATED COUNT: 0.06 X10E9/L (ref 0–0.1)
BASOPHILS/100 LEUKOCYTES IN BLOOD BY AUTOMATED COUNT: 0.7 % (ref 0–2)
BILIRUBIN TOTAL (MG/DL) IN SER/PLAS: 0.5 MG/DL (ref 0–1.2)
BILIRUBIN, URINE: NEGATIVE
BLOOD, URINE: NEGATIVE
CALCIUM (MG/DL) IN SER/PLAS: 9.2 MG/DL (ref 8.6–10.3)
CARBON DIOXIDE, TOTAL (MMOL/L) IN SER/PLAS: 30 MMOL/L (ref 21–32)
CHLORIDE (MMOL/L) IN SER/PLAS: 98 MMOL/L (ref 98–107)
COLOR, URINE: YELLOW
CREATININE (MG/DL) IN SER/PLAS: 1.15 MG/DL (ref 0.5–1.3)
EOSINOPHILS (10*3/UL) IN BLOOD BY AUTOMATED COUNT: 0.39 X10E9/L (ref 0–0.4)
EOSINOPHILS/100 LEUKOCYTES IN BLOOD BY AUTOMATED COUNT: 4.7 % (ref 0–6)
ERYTHROCYTE DISTRIBUTION WIDTH (RATIO) BY AUTOMATED COUNT: 14 % (ref 11.5–14.5)
ERYTHROCYTE MEAN CORPUSCULAR HEMOGLOBIN CONCENTRATION (G/DL) BY AUTOMATED: 32.3 G/DL (ref 32–36)
ERYTHROCYTE MEAN CORPUSCULAR VOLUME (FL) BY AUTOMATED COUNT: 93 FL (ref 80–100)
ERYTHROCYTES (10*6/UL) IN BLOOD BY AUTOMATED COUNT: 4.85 X10E12/L (ref 4.5–5.9)
GFR MALE: 66 ML/MIN/1.73M2
GLUCOSE (MG/DL) IN SER/PLAS: 137 MG/DL (ref 74–99)
GLUCOSE, URINE: NEGATIVE MG/DL
HEMATOCRIT (%) IN BLOOD BY AUTOMATED COUNT: 44.9 % (ref 41–52)
HEMOGLOBIN (G/DL) IN BLOOD: 14.5 G/DL (ref 13.5–17.5)
IMMATURE GRANULOCYTES/100 LEUKOCYTES IN BLOOD BY AUTOMATED COUNT: 0.5 % (ref 0–0.9)
KETONES, URINE: NEGATIVE MG/DL
LEUKOCYTE ESTERASE, URINE: NEGATIVE
LEUKOCYTES (10*3/UL) IN BLOOD BY AUTOMATED COUNT: 8.4 X10E9/L (ref 4.4–11.3)
LYMPHOCYTES (10*3/UL) IN BLOOD BY AUTOMATED COUNT: 0.85 X10E9/L (ref 0.8–3)
LYMPHOCYTES/100 LEUKOCYTES IN BLOOD BY AUTOMATED COUNT: 10.2 % (ref 13–44)
MONOCYTES (10*3/UL) IN BLOOD BY AUTOMATED COUNT: 0.74 X10E9/L (ref 0.05–0.8)
MONOCYTES/100 LEUKOCYTES IN BLOOD BY AUTOMATED COUNT: 8.9 % (ref 2–10)
NEUTROPHILS (10*3/UL) IN BLOOD BY AUTOMATED COUNT: 6.28 X10E9/L (ref 1.6–5.5)
NEUTROPHILS/100 LEUKOCYTES IN BLOOD BY AUTOMATED COUNT: 75 % (ref 40–80)
NITRITE, URINE: NEGATIVE
PH, URINE: 6 (ref 5–8)
PLATELETS (10*3/UL) IN BLOOD AUTOMATED COUNT: 270 X10E9/L (ref 150–450)
POC INR: 3.8 (ref 0.9–1.1)
POC PROTHROMBIN TIME: 45.7 (ref 9.3–12.5)
POTASSIUM (MMOL/L) IN SER/PLAS: 4.3 MMOL/L (ref 3.5–5.3)
PROTEIN TOTAL: 6.5 G/DL (ref 6.4–8.2)
PROTEIN, URINE: NEGATIVE MG/DL
SODIUM (MMOL/L) IN SER/PLAS: 135 MMOL/L (ref 136–145)
SPECIFIC GRAVITY, URINE: 1.02 (ref 1–1.03)
UREA NITROGEN (MG/DL) IN SER/PLAS: 33 MG/DL (ref 6–23)
UROBILINOGEN, URINE: 2 MG/DL (ref 0–1.9)

## 2023-07-28 PROCEDURE — 3078F DIAST BP <80 MM HG: CPT | Performed by: FAMILY MEDICINE

## 2023-07-28 PROCEDURE — 85610 PROTHROMBIN TIME: CPT | Performed by: FAMILY MEDICINE

## 2023-07-28 PROCEDURE — 80053 COMPREHEN METABOLIC PANEL: CPT

## 2023-07-28 PROCEDURE — 1157F ADVNC CARE PLAN IN RCRD: CPT | Performed by: FAMILY MEDICINE

## 2023-07-28 PROCEDURE — 1159F MED LIST DOCD IN RCRD: CPT | Performed by: FAMILY MEDICINE

## 2023-07-28 PROCEDURE — 1036F TOBACCO NON-USER: CPT | Performed by: FAMILY MEDICINE

## 2023-07-28 PROCEDURE — 1160F RVW MEDS BY RX/DR IN RCRD: CPT | Performed by: FAMILY MEDICINE

## 2023-07-28 PROCEDURE — 87086 URINE CULTURE/COLONY COUNT: CPT

## 2023-07-28 PROCEDURE — 81003 URINALYSIS AUTO W/O SCOPE: CPT

## 2023-07-28 PROCEDURE — 99214 OFFICE O/P EST MOD 30 MIN: CPT | Performed by: FAMILY MEDICINE

## 2023-07-28 PROCEDURE — 85025 COMPLETE CBC W/AUTO DIFF WBC: CPT

## 2023-07-28 PROCEDURE — 3075F SYST BP GE 130 - 139MM HG: CPT | Performed by: FAMILY MEDICINE

## 2023-07-28 ASSESSMENT — PATIENT HEALTH QUESTIONNAIRE - PHQ9
SUM OF ALL RESPONSES TO PHQ9 QUESTIONS 1 AND 2: 0
2. FEELING DOWN, DEPRESSED OR HOPELESS: NOT AT ALL
1. LITTLE INTEREST OR PLEASURE IN DOING THINGS: NOT AT ALL

## 2023-07-28 ASSESSMENT — ENCOUNTER SYMPTOMS: DEPRESSION: 0

## 2023-07-28 NOTE — PATIENT INSTRUCTIONS
It is good that your abdomen is no longer tender.  You seem to be much improved today.  Keep your plans for the CT scan next week.  I will order labs today.  A general surgery referral was offered today but you do not want this surgically fixed unless absolutely necessary due to your anticoagulation treatment and history of severe retroperitoneal bleeding in the past.      Your INR is 3.8, which is too high.  Stop the coumadin today and tomorrow. Restart it at the same dose Sunday.  Recheck your INR Monday, 7/31/23.  Return here for a recheck next Monday, 8/7/23.

## 2023-07-28 NOTE — PROGRESS NOTES
Subjective   Patient ID: [id number]     Silvestre Aranda is a 78 y.o. male who presents for Follow-up (One month.  No refills needed at this time.).  HPI    He is here for a recheck.  He was seen for abdominal pain.  A large inguinal hernia was seen in the exam and a CT was ordered.  Labs were ordered as well.  Neither have been done.    His CT is scheduled Monday.  He plans to get this done Monday.    He is feeling better today compared to last week.  He is coughing less and not leaking anymore.  He denies abdominal pain.  The abdominal pain was bad with the cough last week.  He is feeling better.      INR today was 3.8.    Objective     /70 (BP Location: Right arm, Patient Position: Sitting)   Temp 36.9 °C (98.4 °F)   Wt 119 kg (262 lb)   BMI 39.84 kg/m²      Physical Exam  Constitutional:       General: He is not in acute distress.     Appearance: Normal appearance. He is not ill-appearing, toxic-appearing or diaphoretic.   Cardiovascular:      Rate and Rhythm: Normal rate and regular rhythm.   Pulmonary:      Effort: Pulmonary effort is normal.      Breath sounds: Normal breath sounds.   Abdominal:      General: Abdomen is flat.      Palpations: Abdomen is soft.      Tenderness: There is no abdominal tenderness.      Comments: Large inguinal hernia.      Abdomen nontender with deep palpation.     Neurological:      Mental Status: He is alert.         Assessment/Plan   Problem List Items Addressed This Visit    None  Visit Diagnoses       S/P aortic valve replacement    -  Primary    Relevant Orders    POCT INR manually resulted (Completed)    Generalized abdominal pain        Relevant Orders    CBC and Auto Differential    Comprehensive metabolic panel    Urinalysis with Reflex Microscopic    Urine Culture    Right inguinal hernia        Relevant Orders    CBC and Auto Differential    Comprehensive metabolic panel    Urinalysis with Reflex Microscopic          It is good that your abdomen is no longer  tender.  You seem to be much improved today.  Keep your plans for the CT scan next week.  I will order labs today.  A general surgery referral was offered today but you do not want this surgically fixed unless absolutely necessary due to your anticoagulation treatment and history of severe retroperitoneal bleeding in the past.      Your INR is 3.8, which is too high.  Stop the coumadin today and tomorrow. Restart it at the same dose Sunday.  Recheck your INR Monday, 7/31/23.  Return here for a recheck next Monday, 8/7/23.  Lexa Garcia, DO

## 2023-07-29 LAB — URINE CULTURE: NORMAL

## 2023-08-07 ENCOUNTER — TELEPHONE (OUTPATIENT)
Dept: PRIMARY CARE | Facility: CLINIC | Age: 78
End: 2023-08-07
Payer: MEDICARE

## 2023-08-07 NOTE — TELEPHONE ENCOUNTER
----- Message from Lexa Garcia DO sent at 8/6/2023 11:22 PM EDT -----  Please let him know his CT showed a large inguinal hernia with no other serious problems.  Follow up as previously directed.

## 2023-08-07 NOTE — TELEPHONE ENCOUNTER
----- Message from Lexa Garcia DO sent at 8/6/2023 11:49 PM EDT -----  Please let him know he should continue the same dose of coumadin and recheck an INR in one week.

## 2023-08-08 ENCOUNTER — OFFICE VISIT (OUTPATIENT)
Dept: PRIMARY CARE | Facility: CLINIC | Age: 78
End: 2023-08-08
Payer: MEDICARE

## 2023-08-08 VITALS
DIASTOLIC BLOOD PRESSURE: 70 MMHG | TEMPERATURE: 98 F | BODY MASS INDEX: 38.77 KG/M2 | SYSTOLIC BLOOD PRESSURE: 122 MMHG | WEIGHT: 255 LBS

## 2023-08-08 DIAGNOSIS — R11.0 NAUSEA: Primary | ICD-10-CM

## 2023-08-08 PROCEDURE — 1157F ADVNC CARE PLAN IN RCRD: CPT | Performed by: FAMILY MEDICINE

## 2023-08-08 PROCEDURE — 3074F SYST BP LT 130 MM HG: CPT | Performed by: FAMILY MEDICINE

## 2023-08-08 PROCEDURE — 1036F TOBACCO NON-USER: CPT | Performed by: FAMILY MEDICINE

## 2023-08-08 PROCEDURE — 99213 OFFICE O/P EST LOW 20 MIN: CPT | Performed by: FAMILY MEDICINE

## 2023-08-08 PROCEDURE — 3078F DIAST BP <80 MM HG: CPT | Performed by: FAMILY MEDICINE

## 2023-08-08 PROCEDURE — 1159F MED LIST DOCD IN RCRD: CPT | Performed by: FAMILY MEDICINE

## 2023-08-08 PROCEDURE — 1160F RVW MEDS BY RX/DR IN RCRD: CPT | Performed by: FAMILY MEDICINE

## 2023-08-08 ASSESSMENT — ENCOUNTER SYMPTOMS: DEPRESSION: 0

## 2023-08-08 ASSESSMENT — PATIENT HEALTH QUESTIONNAIRE - PHQ9
SUM OF ALL RESPONSES TO PHQ9 QUESTIONS 1 AND 2: 0
1. LITTLE INTEREST OR PLEASURE IN DOING THINGS: NOT AT ALL
2. FEELING DOWN, DEPRESSED OR HOPELESS: NOT AT ALL

## 2023-08-08 NOTE — PROGRESS NOTES
Subjective   Patient ID: [id number]     Silvestre Aranda is a 78 y.o. male who presents for Follow-up.  HPI  He is here for a recheck.      He had the CT scan done.      Large right inguinal hernia with non-obstructed loops of bowel.  Fat around the colon of uncertain significance.  ?possible colitis but could be body habitus.     Hepatic steatosis also seen.    Feeling better except some nausea.  No abdominal pain.  No bowel leakage.  No fever.    No blood in the stool.    Objective     /70 (BP Location: Left arm, Patient Position: Sitting)   Temp 36.7 °C (98 °F)   Wt 116 kg (255 lb)   BMI 38.77 kg/m²      Physical Exam  Constitutional:       Appearance: Normal appearance.   Cardiovascular:      Rate and Rhythm: Normal rate and regular rhythm.      Heart sounds: Normal heart sounds.   Pulmonary:      Effort: Pulmonary effort is normal.      Breath sounds: Normal breath sounds.   Abdominal:      General: Abdomen is flat.      Palpations: Abdomen is soft.      Tenderness: There is no abdominal tenderness.      Hernia: A hernia is present.   Neurological:      General: No focal deficit present.      Mental Status: He is alert.         Assessment/Plan   Problem List Items Addressed This Visit    None  Visit Diagnoses       Nausea    -  Primary        A GI consult was discussed.  You think your symptoms and are related to lactose.  Avoiding lactose is recommended.  You will try avoidance of dairy containing foods.  You will see a dietician.  Weight loss is recommended.  If this does not help, a GI referral will be a consideration.  You have not had colonoscopies lately because of the coumadin and bleeding tendencies.  Continue with INR monitoring at home.    Return in one month for a recheck.    Lexa Garcia,

## 2023-08-08 NOTE — PATIENT INSTRUCTIONS
A GI consult was discussed.  You think your symptoms and are related to lactose.  Avoiding lactose is recommended.  You will try avoidance of dairy containing foods.  You will see a dietician.  Weight loss is recommended.  If this does not help, a GI referral will be a consideration.  You have not had colonoscopies lately because of the coumadin and bleeding tendencies.  Continue with INR monitoring at home.    Return in one month for a recheck.

## 2023-08-22 DIAGNOSIS — F32.A DEPRESSION, UNSPECIFIED: ICD-10-CM

## 2023-08-22 RX ORDER — SERTRALINE HYDROCHLORIDE 100 MG/1
TABLET, FILM COATED ORAL
Qty: 180 TABLET | Refills: 0 | Status: SHIPPED | OUTPATIENT
Start: 2023-08-22 | End: 2023-10-13 | Stop reason: ALTCHOICE

## 2023-08-28 NOTE — PROGRESS NOTES
Spoke with.  INR was again high last week.  It is now back down.  He was on 3 mg q M TH and 2 mg on STWFS.      I recommend he restart coumadin at 2 mg daily.  Recheck INR on Friday 9/1/23.  He agrees.

## 2023-09-05 ENCOUNTER — APPOINTMENT (OUTPATIENT)
Dept: PRIMARY CARE | Facility: CLINIC | Age: 78
End: 2023-09-05
Payer: MEDICARE

## 2023-09-06 ENCOUNTER — PATIENT OUTREACH (OUTPATIENT)
Dept: CARE COORDINATION | Facility: CLINIC | Age: 78
End: 2023-09-06
Payer: MEDICARE

## 2023-09-06 NOTE — PROGRESS NOTES
Call placed regarding 90 day post discharge follow up call.  At time of outreach call the patient feels as if their condition has improved since initial visit with PCP or specialist. Questions or concerns regarding recovery period addressed at this time. Reviewed any PCP or specialists progress notes/labs/radiology reports if applicable and addressed any questions or concerns.

## 2023-09-21 ENCOUNTER — TELEPHONE (OUTPATIENT)
Dept: PRIMARY CARE | Facility: CLINIC | Age: 78
End: 2023-09-21
Payer: MEDICARE

## 2023-10-12 NOTE — PROGRESS NOTES
Subjective     Silvestre Aranda is a 78 y.o. male who presents for the following: Follow-up from July 20, 2023 appointment for chronic plaque psoriasis.  Location generalized.  Patient currently treated with Skyrizi 150 mg every 12 weeks.  Per last chart note on July 20 patient was due for QuantiFERON and had order faxed to [facility].  Reviewed chart no QuantiFERON present.    Review of Systems:  No other skin or systemic complaints other than what is documented elsewhere in the note.    The following portions of the chart were reviewed this encounter and updated as appropriate:         Skin Cancer History  No skin cancer on file.      Specialty Problems          Dermatology Problems    Onychomycosis of toenail    Psoriasis    Skin lesion of face    Tinea cruris        Objective   Well appearing patient in no apparent distress; mood and affect are within normal limits.    A full examination was performed including scalp, head, eyes, ears, nose, lips, neck, chest, axillae, abdomen, back, buttocks, bilateral upper extremities, bilateral lower extremities, hands, feet, fingers, toes, fingernails, and toenails. All findings within normal limits unless otherwise noted below.    Assessment/Plan

## 2023-10-13 ENCOUNTER — TELEMEDICINE (OUTPATIENT)
Dept: DERMATOLOGY | Facility: CLINIC | Age: 78
End: 2023-10-13
Payer: MEDICARE

## 2023-10-13 DIAGNOSIS — I10 ESSENTIAL HYPERTENSION: ICD-10-CM

## 2023-10-13 DIAGNOSIS — N40.0 BENIGN PROSTATIC HYPERPLASIA, UNSPECIFIED WHETHER LOWER URINARY TRACT SYMPTOMS PRESENT: ICD-10-CM

## 2023-10-13 DIAGNOSIS — L40.0 PSORIASIS VULGARIS: Primary | ICD-10-CM

## 2023-10-13 PROBLEM — B00.1 COLD SORE: Status: ACTIVE | Noted: 2023-10-13

## 2023-10-13 PROBLEM — L30.9 ECZEMA: Status: ACTIVE | Noted: 2023-10-13

## 2023-10-13 PROCEDURE — 99213 OFFICE O/P EST LOW 20 MIN: CPT | Performed by: NURSE PRACTITIONER

## 2023-10-13 RX ORDER — TAMSULOSIN HYDROCHLORIDE 0.4 MG/1
0.4 CAPSULE ORAL DAILY
Qty: 90 CAPSULE | Refills: 1 | Status: SHIPPED | OUTPATIENT
Start: 2023-10-13 | End: 2024-02-22 | Stop reason: SDUPTHER

## 2023-10-13 RX ORDER — AMLODIPINE BESYLATE 5 MG/1
5 TABLET ORAL DAILY
Qty: 90 TABLET | Refills: 1 | Status: SHIPPED | OUTPATIENT
Start: 2023-10-13 | End: 2024-02-22 | Stop reason: SDUPTHER

## 2023-10-13 NOTE — PROGRESS NOTES
Subjective     Silvestre Aranda is a 78 y.o. male who presents for the following: Psoriasis (Follow up visit for psoriasis. Patient currently on Skyrizi 150mg. Due for a t-spot.).     Review of Systems:  No other skin or systemic complaints other than what is documented elsewhere in the note.    The following portions of the chart were reviewed this encounter and updated as appropriate:          Skin Cancer History  No skin cancer on file.      Specialty Problems          Dermatology Problems    Onychomycosis of toenail    Psoriasis    Skin lesion of face    Tinea cruris    Eczema        Objective   Well appearing patient in no apparent distress; mood and affect are within normal limits.    A focused skin examination was performed. All findings within normal limits unless otherwise noted below.    Assessment/Plan   1. Psoriasis vulgaris  Clear on exam    Discussed with patient:   There is no known cause of psoriasis.  It may be caused by a combination of immune, genetic, and environmental factors.   Medications, physical or emotional stress and infections may cause flares.   Smoking can worsen psoriasis, especially the palms and soles.     Please tell your provider if you have:   Worsening of redness, scale or itching   Rashes on your scalp, genitals, or skin folds   Joint pain, swelling, or stiffness   Nail changes    Counseled patient regarding the chronic nature of this condition. Discussed current and/or newly prescribed medications as well as reasons to call office prior to next visit (new plaques or flares, new joint pain, etc).     Addressed concerns and questions regarding the treatment and plan with patient in office today.     Risks, benefits, side effects, alternatives and options were discussed with patient and the patient voiced understanding.      PLAN:  Continue Skyrizi 150mg every 12 weeks  Quantiferon ordered in 7/2023, but never drawn because patient was told by [facility] that he didn't need it.  Will  reorder.  Can use clobetasol 0.05% cream twice daily    T-SPOT (Tb)    Related Medications  risankizumab-rzaa (Skyrizi) 150 mg/mL pen injector pen  1 PEN NEEDLE AS DIRECTED SUBCUTANEOUSLY EVERY 12 WEEKS

## 2023-10-13 NOTE — TELEPHONE ENCOUNTER
Refill Tamsulosin .04mg 1qd  Amlodipine 5mg 1qd    Pharmacy: [facility]     LR: [facility] but he wants to use [facility]   LV: 08/08/23  No future appt

## 2023-10-16 ENCOUNTER — SPECIALTY PHARMACY (OUTPATIENT)
Dept: PHARMACY | Facility: CLINIC | Age: 78
End: 2023-10-16

## 2023-10-16 DIAGNOSIS — L40.0 PSORIASIS VULGARIS: ICD-10-CM

## 2023-11-07 DIAGNOSIS — I10 ESSENTIAL HYPERTENSION: ICD-10-CM

## 2023-11-07 RX ORDER — SOTALOL HYDROCHLORIDE 80 MG/1
80 TABLET ORAL 2 TIMES DAILY
Qty: 180 TABLET | Refills: 1 | Status: SHIPPED | OUTPATIENT
Start: 2023-11-07 | End: 2024-05-02

## 2023-11-07 RX ORDER — CHLORTHALIDONE 25 MG/1
25 TABLET ORAL DAILY
Qty: 90 TABLET | Refills: 1 | Status: SHIPPED | OUTPATIENT
Start: 2023-11-07 | End: 2024-03-07 | Stop reason: SINTOL

## 2023-11-07 RX ORDER — VENLAFAXINE HYDROCHLORIDE 75 MG/1
75 CAPSULE, EXTENDED RELEASE ORAL DAILY
Qty: 90 CAPSULE | Refills: 1 | Status: SHIPPED | OUTPATIENT
Start: 2023-11-07 | End: 2024-02-22 | Stop reason: SDUPTHER

## 2023-11-07 NOTE — TELEPHONE ENCOUNTER
----- Message from Silvestre Aranda sent at 11/6/2023  7:06 PM EST -----  Regarding: Trying to correct needed refills  Contact: [phone]  Good elvira, Dr. Garcia;    I need refills on the following three prescriptions:    1. Chlorthalidone, 25 MG Tablet, one a day. This needs to be refilled by [facility].     2. Venlafaxine XR Cap 75MG, refilled via [facility]    3. Sotalol HCL Tab 80 MG, refilled via [facility].

## 2023-11-18 DIAGNOSIS — I48.0 PAROXYSMAL ATRIAL FIBRILLATION (MULTI): ICD-10-CM

## 2023-11-20 RX ORDER — WARFARIN 1 MG/1
TABLET ORAL
Qty: 208 TABLET | Refills: 3 | Status: SHIPPED | OUTPATIENT
Start: 2023-11-20

## 2023-12-04 DIAGNOSIS — M48.062 SPINAL STENOSIS OF LUMBAR REGION WITH NEUROGENIC CLAUDICATION: Primary | ICD-10-CM

## 2024-01-26 DIAGNOSIS — M48.061 SPINAL STENOSIS OF LUMBAR REGION, UNSPECIFIED WHETHER NEUROGENIC CLAUDICATION PRESENT: Primary | ICD-10-CM

## 2024-01-26 RX ORDER — GABAPENTIN 600 MG/1
TABLET ORAL
COMMUNITY
End: 2024-01-26 | Stop reason: SDUPTHER

## 2024-01-26 RX ORDER — GABAPENTIN 600 MG/1
TABLET ORAL
Qty: 540 TABLET | Refills: 0 | Status: SHIPPED | OUTPATIENT
Start: 2024-01-26 | End: 2024-02-22 | Stop reason: SDUPTHER

## 2024-01-26 NOTE — TELEPHONE ENCOUNTER
Refill Gabapentin 600mg 2 tabs in the morning and 4 tabs at night     Pharmacy: [facility]     LR: 06/12/23  LV: 08/08/23  No future appt scheduled

## 2024-02-07 DIAGNOSIS — I10 PRIMARY HYPERTENSION: ICD-10-CM

## 2024-02-08 RX ORDER — LISINOPRIL 40 MG/1
TABLET ORAL
Qty: 90 TABLET | Refills: 3 | OUTPATIENT
Start: 2024-02-08

## 2024-02-22 ENCOUNTER — TELEPHONE (OUTPATIENT)
Dept: PRIMARY CARE | Facility: CLINIC | Age: 79
End: 2024-02-22
Payer: MEDICARE

## 2024-02-22 DIAGNOSIS — I10 PRIMARY HYPERTENSION: ICD-10-CM

## 2024-02-22 DIAGNOSIS — N40.0 BENIGN PROSTATIC HYPERPLASIA, UNSPECIFIED WHETHER LOWER URINARY TRACT SYMPTOMS PRESENT: ICD-10-CM

## 2024-02-22 DIAGNOSIS — I10 ESSENTIAL HYPERTENSION: ICD-10-CM

## 2024-02-22 DIAGNOSIS — E78.5 HYPERLIPIDEMIA, UNSPECIFIED HYPERLIPIDEMIA TYPE: ICD-10-CM

## 2024-02-22 DIAGNOSIS — M48.061 SPINAL STENOSIS OF LUMBAR REGION, UNSPECIFIED WHETHER NEUROGENIC CLAUDICATION PRESENT: ICD-10-CM

## 2024-02-22 RX ORDER — GABAPENTIN 600 MG/1
TABLET ORAL
Qty: 540 TABLET | Refills: 0 | Status: SHIPPED | OUTPATIENT
Start: 2024-02-22 | End: 2024-02-23 | Stop reason: SDUPTHER

## 2024-02-22 RX ORDER — VENLAFAXINE HYDROCHLORIDE 75 MG/1
75 CAPSULE, EXTENDED RELEASE ORAL DAILY
Qty: 90 CAPSULE | Refills: 1 | Status: SHIPPED | OUTPATIENT
Start: 2024-02-22 | End: 2024-02-23 | Stop reason: SDUPTHER

## 2024-02-22 RX ORDER — AMLODIPINE BESYLATE 5 MG/1
5 TABLET ORAL DAILY
Qty: 90 TABLET | Refills: 1 | Status: SHIPPED | OUTPATIENT
Start: 2024-02-22 | End: 2024-02-23 | Stop reason: SDUPTHER

## 2024-02-22 RX ORDER — LISINOPRIL 40 MG/1
40 TABLET ORAL DAILY
Qty: 90 TABLET | Refills: 3 | Status: SHIPPED | OUTPATIENT
Start: 2024-02-22 | End: 2024-02-23 | Stop reason: SDUPTHER

## 2024-02-22 RX ORDER — ATORVASTATIN CALCIUM 20 MG/1
20 TABLET, FILM COATED ORAL DAILY
Qty: 90 TABLET | Refills: 3 | Status: SHIPPED | OUTPATIENT
Start: 2024-02-22 | End: 2024-02-23 | Stop reason: SDUPTHER

## 2024-02-22 RX ORDER — FENOFIBRATE 145 MG/1
145 TABLET, FILM COATED ORAL DAILY
Qty: 90 TABLET | Refills: 3 | Status: SHIPPED | OUTPATIENT
Start: 2024-02-22 | End: 2024-02-23 | Stop reason: SDUPTHER

## 2024-02-22 RX ORDER — TAMSULOSIN HYDROCHLORIDE 0.4 MG/1
0.4 CAPSULE ORAL DAILY
Qty: 90 CAPSULE | Refills: 1 | Status: SHIPPED | OUTPATIENT
Start: 2024-02-22 | End: 2024-02-23 | Stop reason: SDUPTHER

## 2024-02-22 RX ORDER — METOPROLOL SUCCINATE 100 MG/1
100 TABLET, EXTENDED RELEASE ORAL DAILY
Qty: 90 TABLET | Refills: 3 | Status: SHIPPED | OUTPATIENT
Start: 2024-02-22 | End: 2024-02-23 | Stop reason: SDUPTHER

## 2024-02-22 NOTE — TELEPHONE ENCOUNTER
----- Message from Silvestre Aranda sent at 2/21/2024  6:21 PM EST -----  Regarding: scripts for refills  Contact: [phone]  Prescriptions requiring a new prescription.      GABAPENTIN TAB 600MG   METOPROLOL SUCC ER TB 100MG  ATORVASTATIN TAB 20MG  FENOFIBRATE TAB 145MG  TAMSULOSIN CAP 0.4MG  AmLODIPine TAB 5MG  LISINOPRIL TAB 40MG  VENLAFAXINE XR CAP 75MG

## 2024-02-23 RX ORDER — GABAPENTIN 600 MG/1
TABLET ORAL
Qty: 540 TABLET | Refills: 0 | Status: SHIPPED | OUTPATIENT
Start: 2024-02-23 | End: 2024-03-07 | Stop reason: SDUPTHER

## 2024-02-23 RX ORDER — TAMSULOSIN HYDROCHLORIDE 0.4 MG/1
0.4 CAPSULE ORAL DAILY
Qty: 90 CAPSULE | Refills: 1 | Status: SHIPPED | OUTPATIENT
Start: 2024-02-23 | End: 2024-05-14 | Stop reason: SDUPTHER

## 2024-02-23 RX ORDER — ATORVASTATIN CALCIUM 20 MG/1
20 TABLET, FILM COATED ORAL DAILY
Qty: 90 TABLET | Refills: 3 | Status: SHIPPED | OUTPATIENT
Start: 2024-02-23 | End: 2024-05-14 | Stop reason: SDUPTHER

## 2024-02-23 RX ORDER — AMLODIPINE BESYLATE 5 MG/1
5 TABLET ORAL DAILY
Qty: 90 TABLET | Refills: 1 | Status: SHIPPED | OUTPATIENT
Start: 2024-02-23 | End: 2024-05-14 | Stop reason: SDUPTHER

## 2024-02-23 RX ORDER — FENOFIBRATE 145 MG/1
145 TABLET, FILM COATED ORAL DAILY
Qty: 90 TABLET | Refills: 3 | Status: SHIPPED | OUTPATIENT
Start: 2024-02-23

## 2024-02-23 RX ORDER — VENLAFAXINE HYDROCHLORIDE 75 MG/1
75 CAPSULE, EXTENDED RELEASE ORAL DAILY
Qty: 90 CAPSULE | Refills: 1 | Status: SHIPPED | OUTPATIENT
Start: 2024-02-23 | End: 2024-03-07 | Stop reason: SDUPTHER

## 2024-02-23 RX ORDER — LISINOPRIL 40 MG/1
40 TABLET ORAL DAILY
Qty: 90 TABLET | Refills: 3 | Status: SHIPPED | OUTPATIENT
Start: 2024-02-23

## 2024-02-23 RX ORDER — METOPROLOL SUCCINATE 100 MG/1
100 TABLET, EXTENDED RELEASE ORAL DAILY
Qty: 90 TABLET | Refills: 3 | Status: SHIPPED | OUTPATIENT
Start: 2024-02-23

## 2024-02-23 NOTE — TELEPHONE ENCOUNTER
Per patient-Meds should have went to [facility] and went [facility] instead.  Please resend to [facility].  Thanks

## 2024-02-26 ENCOUNTER — TELEPHONE (OUTPATIENT)
Dept: PRIMARY CARE | Facility: CLINIC | Age: 79
End: 2024-02-26
Payer: MEDICARE

## 2024-02-26 NOTE — TELEPHONE ENCOUNTER
Lexa Garcia, DO to [facility] [place] [address] Clinical Support Staff    Please let him know his A1c, at 6.6, is in the diabetic range.  He should decrease sugars and starches form his diet.  He should make an appointment for a recheck and discussion on this.

## 2024-03-07 ENCOUNTER — DOCUMENTATION (OUTPATIENT)
Dept: HOME HEALTH SERVICES | Facility: HOME HEALTH | Age: 79
End: 2024-03-07

## 2024-03-07 ENCOUNTER — OFFICE VISIT (OUTPATIENT)
Dept: PRIMARY CARE | Facility: CLINIC | Age: 79
End: 2024-03-07
Payer: MEDICARE

## 2024-03-07 ENCOUNTER — HOME HEALTH ADMISSION (OUTPATIENT)
Dept: HOME HEALTH SERVICES | Facility: HOME HEALTH | Age: 79
End: 2024-03-07
Payer: MEDICARE

## 2024-03-07 VITALS
WEIGHT: 273.37 LBS | BODY MASS INDEX: 40.4 KG/M2 | DIASTOLIC BLOOD PRESSURE: 60 MMHG | SYSTOLIC BLOOD PRESSURE: 108 MMHG | TEMPERATURE: 97.8 F

## 2024-03-07 DIAGNOSIS — F32.4 MAJOR DEPRESSIVE DISORDER WITH SINGLE EPISODE, IN PARTIAL REMISSION (CMS-HCC): ICD-10-CM
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
## 2024-03-07 DIAGNOSIS — I48.0 PAROXYSMAL ATRIAL FIBRILLATION (MULTI): ICD-10-CM

## 2024-03-07 DIAGNOSIS — F33.1 MODERATE EPISODE OF RECURRENT MAJOR DEPRESSIVE DISORDER (MULTI): Primary | ICD-10-CM

## 2024-03-07 DIAGNOSIS — I10 ESSENTIAL HYPERTENSION: ICD-10-CM

## 2024-03-07 DIAGNOSIS — M48.061 SPINAL STENOSIS OF LUMBAR REGION, UNSPECIFIED WHETHER NEUROGENIC CLAUDICATION PRESENT: ICD-10-CM

## 2024-03-07 DIAGNOSIS — I42.9 CARDIOMYOPATHY, UNSPECIFIED TYPE (MULTI): ICD-10-CM

## 2024-03-07 PROCEDURE — 1036F TOBACCO NON-USER: CPT | Performed by: FAMILY MEDICINE

## 2024-03-07 PROCEDURE — 99214 OFFICE O/P EST MOD 30 MIN: CPT | Performed by: FAMILY MEDICINE

## 2024-03-07 PROCEDURE — 3074F SYST BP LT 130 MM HG: CPT | Performed by: FAMILY MEDICINE

## 2024-03-07 PROCEDURE — 1157F ADVNC CARE PLAN IN RCRD: CPT | Performed by: FAMILY MEDICINE

## 2024-03-07 PROCEDURE — 1159F MED LIST DOCD IN RCRD: CPT | Performed by: FAMILY MEDICINE

## 2024-03-07 PROCEDURE — 1160F RVW MEDS BY RX/DR IN RCRD: CPT | Performed by: FAMILY MEDICINE

## 2024-03-07 PROCEDURE — 3078F DIAST BP <80 MM HG: CPT | Performed by: FAMILY MEDICINE

## 2024-03-07 RX ORDER — HYDROXYZINE PAMOATE 25 MG/1
CAPSULE ORAL
COMMUNITY
Start: 2024-02-07

## 2024-03-07 RX ORDER — VENLAFAXINE HYDROCHLORIDE 150 MG/1
150 CAPSULE, EXTENDED RELEASE ORAL DAILY
Qty: 90 CAPSULE | Refills: 0 | Status: SHIPPED | OUTPATIENT
Start: 2024-03-07 | End: 2024-05-02

## 2024-03-07 RX ORDER — GABAPENTIN 600 MG/1
TABLET ORAL
Qty: 360 TABLET | Refills: 0
Start: 2024-03-07

## 2024-03-07 NOTE — PROGRESS NOTES
Subjective   Patient ID: [id number]     Silvestre Aranda is a 79 y.o. male who presents for Follow-up (Discuss medication) and Fall.  HPI  He is here for a discussion on medication.    He had a fall.  The fall was Danilo 3/3/24.      He had imbalance and poor mobility of the knee.  He was rollator at the time of the fall.     No injury but he could not get up.  The neighbor heard him and helped him up.    He takes gabapentin 600 mg.  Two in the morning and four at night,.    Meds reviewed and reconciled.    Current Outpatient Medications on File Prior to Visit   Medication Sig Dispense Refill    hydrOXYzine pamoate (Vistaril) 25 mg capsule       acetaminophen (TylenoL) 325 mg tablet Take 2 tablets (650 mg) by mouth every 6 (six) hours.      amLODIPine (Norvasc) 5 mg tablet Take 1 tablet (5 mg) by mouth once daily. 90 tablet 1    atorvastatin (Lipitor) 20 mg tablet Take 1 tablet (20 mg) by mouth once daily. 90 tablet 3    fenofibrate (Tricor) 145 mg tablet Take 1 tablet (145 mg) by mouth once daily. 90 tablet 3    lisinopril 40 mg tablet Take 1 tablet (40 mg) by mouth once daily. 90 tablet 3    metoprolol succinate XL (Toprol-XL) 100 mg 24 hr tablet Take 1 tablet (100 mg) by mouth once daily. Do not crush or chew. 90 tablet 3    risankizumab-rzaa (Skyrizi) 150 mg/mL pen injector pen 1 PEN NEEDLE AS DIRECTED SUBCUTANEOUSLY EVERY 12 WEEKS 1 mL 1    sotalol (Betapace) 80 mg tablet Take 1 tablet (80 mg) by mouth 2 times a day. 180 tablet 1    tamsulosin (Flomax) 0.4 mg 24 hr capsule Take 1 capsule (0.4 mg) by mouth once daily. 90 capsule 1    warfarin (Coumadin) 1 mg tablet TAKE 3 TABLETS BY MOUTH EVERY  MONDAY &amp; THURSDAY &amp; TAKE 2  TABLETS BY MOUTH THE REST OF THE WEEK OR AS DIRECTED 208 tablet 3    [DISCONTINUED] chlorthalidone (Hygroton) 25 mg tablet Take 1 tablet (25 mg) by mouth once daily. 90 tablet 1    [DISCONTINUED] gabapentin (Neurontin) 600 mg tablet 2 (two) tablets every morning and 4 (four) tablets every  "night. 540 tablet 0    [DISCONTINUED] hydrocortisone 2.5 % cream       [DISCONTINUED] venlafaxine XR (Effexor-XR) 75 mg 24 hr capsule Take 1 capsule (75 mg) by mouth once daily. With food 90 capsule 1     No current facility-administered medications on file prior to visit.     He has not been himself for a week.  He is here with his daughter.  She is concerned about his \"deep depression\".  He was walking around naked in his house kind of confused per daughter.  That was last week and then he improved in mental status.      Presently he is at baseline with his mental status.    Fell Sunday.  He states he was not dizzy.  He just couldn't move his left knee.    Has depression.  Poorly controlled.  Denies SI. \"I am spiraling down.\"  NO active SI.  Maybe passive, but I am not going to do it.          Objective     /60 (BP Location: Left arm, Patient Position: Sitting)   Temp 36.6 °C (97.8 °F) (Skin)   Wt 124 kg (273 lb 5.9 oz)   BMI 40.40 kg/m²      Physical Exam  Constitutional:       Appearance: Normal appearance.   Cardiovascular:      Rate and Rhythm: Normal rate and regular rhythm.      Heart sounds: Normal heart sounds. No murmur heard.  Pulmonary:      Effort: Pulmonary effort is normal. No respiratory distress.      Breath sounds: Normal breath sounds.   Neurological:      General: No focal deficit present.      Mental Status: He is alert and oriented to person, place, and time.      Gait: Gait abnormal (unstable gait.).   Psychiatric:         Mood and Affect: Mood normal.         Assessment/Plan   Problem List Items Addressed This Visit       Cardiomyopathy (CMS/HCC)    Relevant Orders    Referral to Home Care    Depression, major, in partial remission (CMS/HCC)    Relevant Orders    Referral to Home Care    Essential hypertension    Relevant Medications    venlafaxine XR (Effexor-XR) 150 mg 24 hr capsule    Other Relevant Orders    Referral to Home Care    Fall    Relevant Orders    Referral to Home " Care    Walker with Seat    Lumbar spinal stenosis    Relevant Medications    gabapentin (Neurontin) 600 mg tablet    Other Relevant Orders    Referral to Home Care    Walker with Seat    Paroxysmal atrial fibrillation (CMS/[facility])    Relevant Orders    Referral to Home Care    Moderate episode of recurrent major depressive disorder (CMS/[facility]) - Primary    Relevant Orders    Referral to Home Care    Referral to Psychiatry    Body mass index (BMI) 40.0-44.9, adult (CMS/[facility])     I will increase your effexor and refer you to a psychiatrist.  I will order home PT.  I recommend the use of a walker.  I will stop your chlorthalidone and decrease your gabapentin.    Lexa Garcia, DO

## 2024-03-07 NOTE — PATIENT INSTRUCTIONS
I will increase your effexor and refer you to a psychiatrist.  I will order home PT.  I recommend the use of a walker.  I will stop your chlorthalidone and decrease your gabapentin.

## 2024-03-09 ENCOUNTER — HOME CARE VISIT (OUTPATIENT)
Dept: HOME HEALTH SERVICES | Facility: HOME HEALTH | Age: 79
End: 2024-03-09
Payer: MEDICARE

## 2024-03-09 VITALS
TEMPERATURE: 98.4 F | HEART RATE: 66 BPM | OXYGEN SATURATION: 94 % | DIASTOLIC BLOOD PRESSURE: 80 MMHG | SYSTOLIC BLOOD PRESSURE: 128 MMHG

## 2024-03-09 PROCEDURE — G0151 HHCP-SERV OF PT,EA 15 MIN: HCPCS

## 2024-03-09 PROCEDURE — 0023 HH SOC

## 2024-03-09 ASSESSMENT — GAIT ASSESSMENTS
GAIT SCORE: 8
PATH SCORE: 1
WALKING STANCE: 0 - HEELS APART
STEP CONTINUITY: 1 - STEPS APPEAR CONTINUOUS
STEP SYMMETRY: 1 - RIGHT AND LEFT STEP LENGTH APPEAR EQUAL
BALANCE AND GAIT SCORE: 18
TRUNK: 0 - MARKED SWAY OR USES WALKING AID
TRUNK SCORE: 0
PATH: 1 - MILD/MODERATE DEVIATION OR USES WALKING AID
INITIATION OF GAIT IMMEDIATELY AFTER GO: 1 - NO HESITANCY

## 2024-03-09 ASSESSMENT — ACTIVITIES OF DAILY LIVING (ADL)
AMBULATION ASSISTANCE ON FLAT SURFACES: 1
AMBULATION ASSISTANCE: ONE PERSON
AMBULATION_DISTANCE/DURATION_TOLERATED: 60 FEET
CURRENT_FUNCTION: ONE PERSON
ENTERING_EXITING_HOME: ONE PERSON
OASIS_M1830: 05

## 2024-03-09 ASSESSMENT — ENCOUNTER SYMPTOMS
MUSCLE WEAKNESS: 1
LIMITED RANGE OF MOTION: 1
PAIN LOCATION - PAIN SEVERITY: 3/10
PAIN LOCATION - PAIN SEVERITY: 3/10
PERSON REPORTING PAIN: PATIENT
PAIN LOCATION - PAIN SEVERITY: 3/10
PAIN LOCATION: LEFT KNEE
HYPERTENSION: 1
PAIN LOCATION: RIGHT KNEE
DYSPNEA ON EXERTION: 1
PAIN: 1
PAIN LOCATION: BACK

## 2024-03-09 ASSESSMENT — BALANCE ASSESSMENTS
SITTING DOWN: 1 - USES ARMS OR NOT SMOOTH MOTION
TURNING 360 DEGREES STEPS: 1 - CONTINUOUS STEPS
ARISES: 1 - ABLE, USES ARMS TO HELP
NUDGED SCORE: 1
ARISING SCORE: 1
ATTEMPTS TO ARISE: 2 - ABLE TO RISE, ONE ATTEMPT
STANDING BALANCE: 1 - STEADY BUT WIDE STANCE AND USES CANE OR OTHER SUPPORT
EYES CLOSED AT MAXIMUM POSITION NUDGED: 0 - UNSTEADY
IMMEDIATE STANDING BALANCE FIRST 5 SECONDS: 2 - STEADY WITHOUT WALKER OR OTHER SUPPORT
BALANCE SCORE: 10
SITTING BALANCE: 1 - STEADY, SAFE
NUDGED: 1 - STAGGERS, GRABS, CATCHES SELF

## 2024-03-09 NOTE — HOME HEALTH
Pt is 78 y/o male with PMHx significant for CAD, DJD, neuropathy, cardiomyopathy, HTN, HLD, afib, tachycardia, s/p AVR, s/p CABG x3, major depression, arthritis, falls, knee OA, lumbar stenosis and radiculitis, insomnia, obesity. Pt referred to PT services d/t fall on 3/3/24 related to poor balance and knee instability. Pt unfortunately suffers from severe depression; MD placed referral for psychiatrist and increased Effexor. Pt lives in 1-story apartment on 1st floor; daughter Anita lives on 3rd floor and is available daily to assist. Pt/daughter report 2 falls in past year. Pt uses rollator walker at baseline. Pt presents with deficits in strength, balance, gait, functional mobility, and will benefit from PT at 1w1, 2w3, 1w1. Pt/daughter agreeable.

## 2024-03-11 ENCOUNTER — HOME CARE VISIT (OUTPATIENT)
Dept: HOME HEALTH SERVICES | Facility: HOME HEALTH | Age: 79
End: 2024-03-11
Payer: MEDICARE

## 2024-03-11 PROCEDURE — G0152 HHCP-SERV OF OT,EA 15 MIN: HCPCS

## 2024-03-11 ASSESSMENT — ACTIVITIES OF DAILY LIVING (ADL)
DRESSING_LB_CURRENT_FUNCTION: SUPERVISION
BATHING_CURRENT_FUNCTION: SUPERVISION
BATHING ASSESSED: 1
AMBULATION ASSISTANCE: 1
BATHING_CURRENT_FUNCTION: CONTACT GUARD ASSIST
AMBULATION ASSISTANCE: INDEPENDENT

## 2024-03-11 ASSESSMENT — ENCOUNTER SYMPTOMS
DENIES PAIN: 1
PERSON REPORTING PAIN: PATIENT

## 2024-03-12 ENCOUNTER — HOME CARE VISIT (OUTPATIENT)
Dept: HOME HEALTH SERVICES | Facility: HOME HEALTH | Age: 79
End: 2024-03-12
Payer: MEDICARE

## 2024-03-12 VITALS
DIASTOLIC BLOOD PRESSURE: 82 MMHG | HEART RATE: 78 BPM | OXYGEN SATURATION: 97 % | TEMPERATURE: 97.7 F | RESPIRATION RATE: 18 BRPM | SYSTOLIC BLOOD PRESSURE: 124 MMHG

## 2024-03-12 PROCEDURE — G0157 HHC PT ASSISTANT EA 15: HCPCS

## 2024-03-12 ASSESSMENT — ENCOUNTER SYMPTOMS
PAIN LOCATION - RELIEVING FACTORS: STRETCHING
PAIN LOCATION: NECK
PAIN SEVERITY GOAL: 0/10
LOWEST PAIN SEVERITY IN PAST 24 HOURS: 1/10
PAIN LOCATION - PAIN QUALITY: THROBBING
PAIN LOCATION - PAIN SEVERITY: 3/10
SUBJECTIVE PAIN PROGRESSION: GRADUALLY IMPROVING
PAIN LOCATION - PAIN FREQUENCY: INTERMITTENT
PAIN: 1
HIGHEST PAIN SEVERITY IN PAST 24 HOURS: 4/10
PERSON REPORTING PAIN: PATIENT
PAIN LOCATION - EXACERBATING FACTORS: POSTURE

## 2024-03-14 ENCOUNTER — HOME CARE VISIT (OUTPATIENT)
Dept: HOME HEALTH SERVICES | Facility: HOME HEALTH | Age: 79
End: 2024-03-14
Payer: MEDICARE

## 2024-03-14 VITALS
DIASTOLIC BLOOD PRESSURE: 80 MMHG | RESPIRATION RATE: 18 BRPM | HEART RATE: 78 BPM | SYSTOLIC BLOOD PRESSURE: 124 MMHG | TEMPERATURE: 97.6 F | OXYGEN SATURATION: 97 %

## 2024-03-14 PROCEDURE — G0157 HHC PT ASSISTANT EA 15: HCPCS

## 2024-03-14 ASSESSMENT — ENCOUNTER SYMPTOMS
PERSON REPORTING PAIN: PATIENT
DENIES PAIN: 1

## 2024-03-15 PROCEDURE — G0180 MD CERTIFICATION HHA PATIENT: HCPCS | Performed by: FAMILY MEDICINE

## 2024-03-18 ENCOUNTER — HOME CARE VISIT (OUTPATIENT)
Dept: HOME HEALTH SERVICES | Facility: HOME HEALTH | Age: 79
End: 2024-03-18
Payer: MEDICARE

## 2024-03-18 VITALS — TEMPERATURE: 96.8 F

## 2024-03-18 PROCEDURE — G0158 HHC OT ASSISTANT EA 15: HCPCS

## 2024-03-18 ASSESSMENT — ENCOUNTER SYMPTOMS
PAIN LOCATION: RIGHT HIP
PERSON REPORTING PAIN: PATIENT
SUBJECTIVE PAIN PROGRESSION: GRADUALLY IMPROVING
LOWEST PAIN SEVERITY IN PAST 24 HOURS: 0/10
HIGHEST PAIN SEVERITY IN PAST 24 HOURS: 3/10
PAIN: 1

## 2024-03-19 ENCOUNTER — HOME CARE VISIT (OUTPATIENT)
Dept: HOME HEALTH SERVICES | Facility: HOME HEALTH | Age: 79
End: 2024-03-19
Payer: MEDICARE

## 2024-03-19 VITALS
SYSTOLIC BLOOD PRESSURE: 124 MMHG | TEMPERATURE: 98 F | HEART RATE: 82 BPM | DIASTOLIC BLOOD PRESSURE: 82 MMHG | RESPIRATION RATE: 18 BRPM | OXYGEN SATURATION: 98 %

## 2024-03-19 PROCEDURE — G0157 HHC PT ASSISTANT EA 15: HCPCS

## 2024-03-19 ASSESSMENT — ENCOUNTER SYMPTOMS
DENIES PAIN: 1
PERSON REPORTING PAIN: PATIENT

## 2024-03-21 ENCOUNTER — TELEPHONE (OUTPATIENT)
Dept: PRIMARY CARE | Facility: CLINIC | Age: 79
End: 2024-03-21
Payer: MEDICARE

## 2024-03-21 ENCOUNTER — HOME CARE VISIT (OUTPATIENT)
Dept: HOME HEALTH SERVICES | Facility: HOME HEALTH | Age: 79
End: 2024-03-21
Payer: MEDICARE

## 2024-03-21 VITALS
DIASTOLIC BLOOD PRESSURE: 82 MMHG | OXYGEN SATURATION: 98 % | RESPIRATION RATE: 18 BRPM | SYSTOLIC BLOOD PRESSURE: 120 MMHG | TEMPERATURE: 97.8 F | HEART RATE: 78 BPM
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
## 2024-03-21 DIAGNOSIS — M48.061 SPINAL STENOSIS OF LUMBAR REGION, UNSPECIFIED WHETHER NEUROGENIC CLAUDICATION PRESENT: Primary | ICD-10-CM

## 2024-03-21 PROCEDURE — G0157 HHC PT ASSISTANT EA 15: HCPCS

## 2024-03-21 ASSESSMENT — ENCOUNTER SYMPTOMS
DENIES PAIN: 1
PERSON REPORTING PAIN: PATIENT

## 2024-03-21 NOTE — TELEPHONE ENCOUNTER
LM on VM requesting script for a normal walker (Not a rollator).  Order should be sent to [facility].

## 2024-03-25 ENCOUNTER — HOME CARE VISIT (OUTPATIENT)
Dept: HOME HEALTH SERVICES | Facility: HOME HEALTH | Age: 79
End: 2024-03-25
Payer: MEDICARE

## 2024-03-25 VITALS — TEMPERATURE: 97.5 F

## 2024-03-25 PROCEDURE — G0158 HHC OT ASSISTANT EA 15: HCPCS

## 2024-03-25 ASSESSMENT — ENCOUNTER SYMPTOMS
PERSON REPORTING PAIN: PATIENT
DENIES PAIN: 1

## 2024-03-28 ENCOUNTER — TELEMEDICINE (OUTPATIENT)
Dept: BEHAVIORAL HEALTH | Facility: CLINIC | Age: 79
End: 2024-03-28
Payer: MEDICARE

## 2024-03-28 ENCOUNTER — HOME CARE VISIT (OUTPATIENT)
Dept: HOME HEALTH SERVICES | Facility: HOME HEALTH | Age: 79
End: 2024-03-28
Payer: MEDICARE

## 2024-03-28 VITALS
SYSTOLIC BLOOD PRESSURE: 118 MMHG | OXYGEN SATURATION: 98 % | DIASTOLIC BLOOD PRESSURE: 74 MMHG | TEMPERATURE: 97.6 F | RESPIRATION RATE: 18 BRPM | HEART RATE: 78 BPM

## 2024-03-28 DIAGNOSIS — F33.1 MODERATE EPISODE OF RECURRENT MAJOR DEPRESSIVE DISORDER (MULTI): ICD-10-CM

## 2024-03-28 PROCEDURE — 1160F RVW MEDS BY RX/DR IN RCRD: CPT | Performed by: PSYCHOLOGIST

## 2024-03-28 PROCEDURE — 1159F MED LIST DOCD IN RCRD: CPT | Performed by: PSYCHOLOGIST

## 2024-03-28 PROCEDURE — 1157F ADVNC CARE PLAN IN RCRD: CPT | Performed by: PSYCHOLOGIST

## 2024-03-28 PROCEDURE — 90791 PSYCH DIAGNOSTIC EVALUATION: CPT | Performed by: PSYCHOLOGIST

## 2024-03-28 PROCEDURE — G0157 HHC PT ASSISTANT EA 15: HCPCS

## 2024-03-28 ASSESSMENT — ENCOUNTER SYMPTOMS
DENIES PAIN: 1
PERSON REPORTING PAIN: PATIENT

## 2024-03-29 ENCOUNTER — HOME CARE VISIT (OUTPATIENT)
Dept: HOME HEALTH SERVICES | Facility: HOME HEALTH | Age: 79
End: 2024-03-29
Payer: MEDICARE
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
## 2024-04-01 ENCOUNTER — HOME CARE VISIT (OUTPATIENT)
Dept: HOME HEALTH SERVICES | Facility: HOME HEALTH | Age: 79
End: 2024-04-01
Payer: MEDICARE

## 2024-04-01 PROCEDURE — G0152 HHCP-SERV OF OT,EA 15 MIN: HCPCS

## 2024-04-01 ASSESSMENT — ACTIVITIES OF DAILY LIVING (ADL)
GROOMING_CURRENT_FUNCTION: INDEPENDENT
DRESSING_LB_CURRENT_FUNCTION: INDEPENDENT
PHYSICAL TRANSFERS ASSESSED: 1
AMBULATION ASSISTANCE: INDEPENDENT
GROOMING ASSESSED: 1
CURRENT_FUNCTION: INDEPENDENT
TOILETING: 1
BATHING_CURRENT_FUNCTION: INDEPENDENT
BATHING ASSESSED: 1
AMBULATION ASSISTANCE: 1
DRESSING_UB_CURRENT_FUNCTION: INDEPENDENT
TOILETING: INDEPENDENT

## 2024-04-01 ASSESSMENT — ENCOUNTER SYMPTOMS: DENIES PAIN: 1

## 2024-04-02 ENCOUNTER — HOME CARE VISIT (OUTPATIENT)
Dept: HOME HEALTH SERVICES | Facility: HOME HEALTH | Age: 79
End: 2024-04-02
Payer: MEDICARE

## 2024-04-02 VITALS
DIASTOLIC BLOOD PRESSURE: 60 MMHG | HEART RATE: 66 BPM | OXYGEN SATURATION: 93 % | TEMPERATURE: 98.7 F | SYSTOLIC BLOOD PRESSURE: 125 MMHG

## 2024-04-02 PROCEDURE — G0151 HHCP-SERV OF PT,EA 15 MIN: HCPCS

## 2024-04-02 ASSESSMENT — GAIT ASSESSMENTS
STEP SYMMETRY: 1 - RIGHT AND LEFT STEP LENGTH APPEAR EQUAL
WALKING STANCE: 0 - HEELS APART
BALANCE AND GAIT SCORE: 20
STEP CONTINUITY: 1 - STEPS APPEAR CONTINUOUS
GAIT SCORE: 8
PATH SCORE: 0
TRUNK SCORE: 1
PATH: 0 - MARKED DEVIATION
INITIATION OF GAIT IMMEDIATELY AFTER GO: 1 - NO HESITANCY
TRUNK: 1 - NO SWAY BUT FLEXION OF KNEES OR BACK OR SPREADS ARMS WHILE WALKING

## 2024-04-02 ASSESSMENT — BALANCE ASSESSMENTS
BALANCE SCORE: 12
ATTEMPTS TO ARISE: 2 - ABLE TO RISE, ONE ATTEMPT
SITTING BALANCE: 1 - STEADY, SAFE
ARISING SCORE: 2
NUDGED: 0 - BEGINS TO FALL
SITTING DOWN: 2 - SAFE, SMOOTH MOTION
EYES CLOSED AT MAXIMUM POSITION NUDGED: 0 - UNSTEADY
STANDING BALANCE: 1 - STEADY BUT WIDE STANCE AND USES CANE OR OTHER SUPPORT
NUDGED SCORE: 0
IMMEDIATE STANDING BALANCE FIRST 5 SECONDS: 2 - STEADY WITHOUT WALKER OR OTHER SUPPORT
TURNING 360 DEGREES STEPS: 1 - CONTINUOUS STEPS
ARISES: 2 - ABLE WITHOUT USING ARMS

## 2024-04-02 ASSESSMENT — ENCOUNTER SYMPTOMS
PERSON REPORTING PAIN: PATIENT
DENIES PAIN: 1

## 2024-04-02 ASSESSMENT — ACTIVITIES OF DAILY LIVING (ADL)
AMBULATION ASSISTANCE ON FLAT SURFACES: 1
OASIS_M1830: 01
HOME_HEALTH_OASIS: 00

## 2024-04-02 NOTE — HOME HEALTH
Spoke with patient regarding depression - pt states he has weekly appts with psychologist and has appt next week with PCP to address these concerns.

## 2024-04-04 ENCOUNTER — APPOINTMENT (OUTPATIENT)
Dept: BEHAVIORAL HEALTH | Facility: CLINIC | Age: 79
End: 2024-04-04
Payer: MEDICARE

## 2024-04-08 ENCOUNTER — APPOINTMENT (OUTPATIENT)
Dept: BEHAVIORAL HEALTH | Facility: CLINIC | Age: 79
End: 2024-04-08
Payer: MEDICARE

## 2024-04-11 ENCOUNTER — APPOINTMENT (OUTPATIENT)
Dept: DERMATOLOGY | Facility: CLINIC | Age: 79
End: 2024-04-11
Payer: MEDICARE

## 2024-04-12 ENCOUNTER — OFFICE VISIT (OUTPATIENT)
Dept: PRIMARY CARE | Facility: CLINIC | Age: 79
End: 2024-04-12
Payer: MEDICARE

## 2024-04-12 VITALS
BODY MASS INDEX: 41.38 KG/M2 | SYSTOLIC BLOOD PRESSURE: 122 MMHG | TEMPERATURE: 97.6 F | DIASTOLIC BLOOD PRESSURE: 70 MMHG | WEIGHT: 280 LBS

## 2024-04-12 DIAGNOSIS — Z95.2 S/P AORTIC VALVE REPLACEMENT: ICD-10-CM

## 2024-04-12 DIAGNOSIS — I10 ESSENTIAL HYPERTENSION: ICD-10-CM
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
## 2024-04-12 DIAGNOSIS — E11.9 TYPE 2 DIABETES MELLITUS WITHOUT COMPLICATION, WITHOUT LONG-TERM CURRENT USE OF INSULIN (MULTI): ICD-10-CM

## 2024-04-12 DIAGNOSIS — F32.4 MAJOR DEPRESSIVE DISORDER WITH SINGLE EPISODE, IN PARTIAL REMISSION (CMS-HCC): ICD-10-CM

## 2024-04-12 DIAGNOSIS — R73.9 HYPERGLYCEMIA: Primary | ICD-10-CM

## 2024-04-12 LAB
POC HEMOGLOBIN A1C: 7.8 % (ref 4.2–6.5)
POC INR: 2.3 (ref 0.9–1.1)
POC PROTHROMBIN TIME: 27.4 (ref 9.3–12.5)

## 2024-04-12 PROCEDURE — 3078F DIAST BP <80 MM HG: CPT | Performed by: FAMILY MEDICINE

## 2024-04-12 PROCEDURE — 3074F SYST BP LT 130 MM HG: CPT | Performed by: FAMILY MEDICINE

## 2024-04-12 PROCEDURE — 1160F RVW MEDS BY RX/DR IN RCRD: CPT | Performed by: FAMILY MEDICINE

## 2024-04-12 PROCEDURE — 1157F ADVNC CARE PLAN IN RCRD: CPT | Performed by: FAMILY MEDICINE

## 2024-04-12 PROCEDURE — 1159F MED LIST DOCD IN RCRD: CPT | Performed by: FAMILY MEDICINE

## 2024-04-12 PROCEDURE — 85610 PROTHROMBIN TIME: CPT | Performed by: FAMILY MEDICINE

## 2024-04-12 PROCEDURE — 99214 OFFICE O/P EST MOD 30 MIN: CPT | Performed by: FAMILY MEDICINE

## 2024-04-12 PROCEDURE — 83036 HEMOGLOBIN GLYCOSYLATED A1C: CPT | Performed by: FAMILY MEDICINE

## 2024-04-12 PROCEDURE — 1036F TOBACCO NON-USER: CPT | Performed by: FAMILY MEDICINE

## 2024-04-12 RX ORDER — METFORMIN HYDROCHLORIDE 500 MG/1
500 TABLET ORAL
Qty: 180 TABLET | Refills: 1 | Status: SHIPPED | OUTPATIENT
Start: 2024-04-12 | End: 2025-05-17

## 2024-04-12 RX ORDER — SERTRALINE HYDROCHLORIDE 50 MG/1
50 TABLET, FILM COATED ORAL DAILY
Qty: 90 TABLET | Refills: 1 | Status: SHIPPED | OUTPATIENT
Start: 2024-04-12 | End: 2024-10-09

## 2024-04-12 NOTE — PROGRESS NOTES
Subjective   Patient ID: [id number]     Silvestre Aranda is a 79 y.o. male who presents for Follow-up (One month).  HPI    He is here for a one month recheck.      He was seen last month after a fall.  His blood pressure was running low.  His gabapentin was lowered and his chlorthalidone was stopped.  Home PT was ordered.    His blood pressure today is 122/70.    He has not fallen at all since the last visit.  He is using the two wheeled walker.  He has had home PT.  He states he has been discharged from that program.                  Objective     /70 (BP Location: Right arm, Patient Position: Sitting)   Temp 36.4 °C (97.6 °F) (Skin)   Wt 127 kg (280 lb)   BMI 41.38 kg/m²      Physical Exam  Constitutional:       Appearance: Normal appearance.   Cardiovascular:      Rate and Rhythm: Normal rate and regular rhythm.      Heart sounds: Normal heart sounds. No murmur heard.  Pulmonary:      Effort: Pulmonary effort is normal. No respiratory distress.      Breath sounds: Normal breath sounds.   Neurological:      General: No focal deficit present.      Mental Status: He is alert and oriented to person, place, and time. Mental status is at baseline.      Coordination: Coordination normal.      Gait: Gait abnormal (more stable with walker.).      Comments: Speech clear and fluent.           Assessment/Plan   Problem List Items Addressed This Visit       Essential hypertension    Fall     Other Visit Diagnoses       Hyperglycemia    -  Primary    Relevant Orders    POCT glycosylated hemoglobin (Hb A1C) manually resulted (Completed)    S/P aortic valve replacement        Relevant Orders    POCT INR manually resulted (Completed)    Type 2 diabetes mellitus without complication, without long-term current use of insulin (Multi)        Relevant Medications    metFORMIN (Glucophage) 500 mg tablet        Your glucose is in the diabetic range.  I will add on a medication to try to help lower your blood glucose.  I  recommend that you avoid sugars and starches from your diet and decrease portions to try to lose weight.  Your INR is at a good level.  Continue your present dose of warfarin.  Recheck the INR in three weeks.  Return here in three months.      Lexa Garcia, DO

## 2024-04-12 NOTE — PATIENT INSTRUCTIONS
Your glucose is in the diabetic range.  I will add on a medication to try to help lower your blood glucose.  I recommend that you avoid sugars and starches from your diet and decrease portions to try to lose weight.  Your INR is at a good level.  Continue your present dose of warfarin.  Recheck the INR in three weeks.  Return here in three months.

## 2024-04-15 ENCOUNTER — APPOINTMENT (OUTPATIENT)
Dept: BEHAVIORAL HEALTH | Facility: CLINIC | Age: 79
End: 2024-04-15
Payer: MEDICARE

## 2024-04-22 ENCOUNTER — APPOINTMENT (OUTPATIENT)
Dept: BEHAVIORAL HEALTH | Facility: CLINIC | Age: 79
End: 2024-04-22
Payer: MEDICARE

## 2024-04-29 ENCOUNTER — APPOINTMENT (OUTPATIENT)
Dept: BEHAVIORAL HEALTH | Facility: CLINIC | Age: 79
End: 2024-04-29
Payer: MEDICARE

## 2024-05-01 DIAGNOSIS — I10 ESSENTIAL HYPERTENSION: ICD-10-CM

## 2024-05-02 RX ORDER — SOTALOL HYDROCHLORIDE 80 MG/1
80 TABLET ORAL 2 TIMES DAILY
Qty: 180 TABLET | Refills: 3 | Status: SHIPPED | OUTPATIENT
Start: 2024-05-02

## 2024-05-02 RX ORDER — VENLAFAXINE HYDROCHLORIDE 150 MG/1
CAPSULE, EXTENDED RELEASE ORAL
Qty: 90 CAPSULE | Refills: 3 | Status: SHIPPED | OUTPATIENT
Start: 2024-05-02

## 2024-05-14 ENCOUNTER — TELEPHONE (OUTPATIENT)
Dept: PRIMARY CARE | Facility: CLINIC | Age: 79
End: 2024-05-14
Payer: MEDICARE

## 2024-05-14 DIAGNOSIS — E78.5 HYPERLIPIDEMIA, UNSPECIFIED HYPERLIPIDEMIA TYPE: ICD-10-CM

## 2024-05-14 DIAGNOSIS — N40.0 BENIGN PROSTATIC HYPERPLASIA, UNSPECIFIED WHETHER LOWER URINARY TRACT SYMPTOMS PRESENT: ICD-10-CM

## 2024-05-14 DIAGNOSIS — I10 ESSENTIAL HYPERTENSION: ICD-10-CM

## 2024-05-14 RX ORDER — ATORVASTATIN CALCIUM 20 MG/1
20 TABLET, FILM COATED ORAL DAILY
Qty: 100 TABLET | Refills: 0 | Status: SHIPPED | OUTPATIENT
Start: 2024-05-14

## 2024-05-14 RX ORDER — TAMSULOSIN HYDROCHLORIDE 0.4 MG/1
0.4 CAPSULE ORAL DAILY
Qty: 100 CAPSULE | Refills: 0 | Status: SHIPPED | OUTPATIENT
Start: 2024-05-14

## 2024-05-14 RX ORDER — AMLODIPINE BESYLATE 5 MG/1
5 TABLET ORAL DAILY
Qty: 100 TABLET | Refills: 0 | Status: SHIPPED | OUTPATIENT
Start: 2024-05-14

## 2024-05-14 NOTE — TELEPHONE ENCOUNTER
Can you refill Amlodipine 5mg 1 every day  Tamsulosin 0.4mg 1 every day  Liptor 20mg 1 every day    He has a refill on these but his insurance allows him to get 100 day supply at a time. The Rx's from 02/23/24 were filled by [facility] for 100 day supply. Can you send new Rx's to [facility]?

## 2024-05-16 ENCOUNTER — TELEMEDICINE (OUTPATIENT)
Dept: BEHAVIORAL HEALTH | Facility: CLINIC | Age: 79
End: 2024-05-16
Payer: MEDICARE

## 2024-05-16 DIAGNOSIS — F33.0 MILD EPISODE OF RECURRENT MAJOR DEPRESSIVE DISORDER (CMS-HCC): ICD-10-CM

## 2024-05-16 PROCEDURE — 1157F ADVNC CARE PLAN IN RCRD: CPT | Performed by: PSYCHOLOGIST

## 2024-05-16 PROCEDURE — 1159F MED LIST DOCD IN RCRD: CPT | Performed by: PSYCHOLOGIST

## 2024-05-16 PROCEDURE — 1160F RVW MEDS BY RX/DR IN RCRD: CPT | Performed by: PSYCHOLOGIST

## 2024-05-16 PROCEDURE — 90837 PSYTX W PT 60 MINUTES: CPT | Performed by: PSYCHOLOGIST
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
## 2024-05-23 ENCOUNTER — TELEMEDICINE (OUTPATIENT)
Dept: BEHAVIORAL HEALTH | Facility: CLINIC | Age: 79
End: 2024-05-23
Payer: MEDICARE

## 2024-05-23 DIAGNOSIS — F33.0 MILD EPISODE OF RECURRENT MAJOR DEPRESSIVE DISORDER (CMS-HCC): ICD-10-CM

## 2024-05-23 PROCEDURE — 1159F MED LIST DOCD IN RCRD: CPT | Performed by: PSYCHOLOGIST

## 2024-05-23 PROCEDURE — 90837 PSYTX W PT 60 MINUTES: CPT | Performed by: PSYCHOLOGIST

## 2024-05-23 PROCEDURE — 1160F RVW MEDS BY RX/DR IN RCRD: CPT | Performed by: PSYCHOLOGIST

## 2024-05-23 PROCEDURE — 1157F ADVNC CARE PLAN IN RCRD: CPT | Performed by: PSYCHOLOGIST
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
## 2024-05-30 ENCOUNTER — APPOINTMENT (OUTPATIENT)
Dept: PRIMARY CARE | Facility: CLINIC | Age: 79
End: 2024-05-30
Payer: MEDICARE

## 2024-06-06 ENCOUNTER — APPOINTMENT (OUTPATIENT)
Dept: BEHAVIORAL HEALTH | Facility: CLINIC | Age: 79
End: 2024-06-06
Payer: MEDICARE

## 2024-06-13 ENCOUNTER — APPOINTMENT (OUTPATIENT)
Dept: BEHAVIORAL HEALTH | Facility: CLINIC | Age: 79
End: 2024-06-13
Payer: MEDICARE

## 2024-06-13 DIAGNOSIS — F33.0 MILD EPISODE OF RECURRENT MAJOR DEPRESSIVE DISORDER (CMS-HCC): ICD-10-CM

## 2024-06-13 PROCEDURE — 90837 PSYTX W PT 60 MINUTES: CPT | Performed by: PSYCHOLOGIST

## 2024-06-13 PROCEDURE — 1157F ADVNC CARE PLAN IN RCRD: CPT | Performed by: PSYCHOLOGIST

## 2024-06-13 NOTE — PROGRESS NOTES
"2 PM 06753 [facility] for MDD, Recurrent (60 MIN, 203-257)  Virtual. Supportive Therapy. Low carb diet, making some headway, has lost 15 pounds. May talk to doctor about trying med to help lose weight. Discussed his getting fixated by wrongs from first wife (e.g., not being faithful), will ruminate on. Discussed ways to reframe. Utilizing distraction now. At times \"feels cheated\" financially, ex kept home. Again, brought up at 52th birthday when they were out together, she reached over and said she wanted a divorce. Trial separation period when she was seeing others. Still plans on looking in on senior citizen centers. Watches [employer], [employer], [employer] games. Believes effexor and sertraline helping mood and ruminating. Next: 2 weeks.    "

## 2024-06-20 DIAGNOSIS — E11.9 TYPE 2 DIABETES MELLITUS WITHOUT COMPLICATION, WITHOUT LONG-TERM CURRENT USE OF INSULIN (MULTI): ICD-10-CM

## 2024-06-20 RX ORDER — METFORMIN HYDROCHLORIDE 500 MG/1
500 TABLET ORAL
Qty: 200 TABLET | Refills: 2 | Status: SHIPPED | OUTPATIENT
Start: 2024-06-20

## 2024-06-25 ENCOUNTER — APPOINTMENT (OUTPATIENT)
Dept: BEHAVIORAL HEALTH | Facility: CLINIC | Age: 79
End: 2024-06-25
Payer: MEDICARE

## 2024-06-25 DIAGNOSIS — F33.0 MILD EPISODE OF RECURRENT MAJOR DEPRESSIVE DISORDER (CMS-HCC): ICD-10-CM

## 2024-06-25 PROCEDURE — 90837 PSYTX W PT 60 MINUTES: CPT | Performed by: PSYCHOLOGIST

## 2024-06-25 PROCEDURE — 1157F ADVNC CARE PLAN IN RCRD: CPT | Performed by: PSYCHOLOGIST

## 2024-06-25 NOTE — PROGRESS NOTES
2 PM 91939 [facility] for MDD, Recurrent (60 MIN, 203-257)  Virtual. Supportive Therapy. Trying to connect with oldest daughter, left VM on Father's Day, didn't hear back. Plans on trying again. Wants to forgive her for throwing him out of house a few years ago on X-mas. Maintaining contact with youngest daughter and her daughter who wants to transition. Not getting out as much, concerns about falling. Keto diet going well, still wants to lose 60 more pounds. Looking into [facility] senior center/rec center. Also , may look into [employer] benefits. Next: 2 weeks

## 2024-06-27 ENCOUNTER — APPOINTMENT (OUTPATIENT)
Dept: PRIMARY CARE | Facility: CLINIC | Age: 79
End: 2024-06-27
Payer: MEDICARE

## 2024-06-28 ENCOUNTER — PATIENT MESSAGE (OUTPATIENT)
Dept: PRIMARY CARE | Facility: CLINIC | Age: 79
End: 2024-06-28
Payer: MEDICARE

## 2024-06-28 DIAGNOSIS — M48.061 SPINAL STENOSIS OF LUMBAR REGION, UNSPECIFIED WHETHER NEUROGENIC CLAUDICATION PRESENT: ICD-10-CM

## 2024-07-01 RX ORDER — GABAPENTIN 600 MG/1
TABLET ORAL
Qty: 360 TABLET | Refills: 0 | Status: SHIPPED | OUTPATIENT
Start: 2024-07-01

## 2024-07-10 ENCOUNTER — APPOINTMENT (OUTPATIENT)
Dept: BEHAVIORAL HEALTH | Facility: CLINIC | Age: 79
End: 2024-07-10
Payer: MEDICARE

## 2024-07-10 DIAGNOSIS — F33.0 MILD EPISODE OF RECURRENT MAJOR DEPRESSIVE DISORDER (CMS-HCC): ICD-10-CM

## 2024-07-10 PROCEDURE — 90834 PSYTX W PT 45 MINUTES: CPT | Performed by: PSYCHOLOGIST

## 2024-07-10 PROCEDURE — 1157F ADVNC CARE PLAN IN RCRD: CPT | Performed by: PSYCHOLOGIST

## 2024-07-10 NOTE — PROGRESS NOTES
2 PM 72823 [facility] for MDD, Recurrent (60 MIN, 205-256)  Virtual. Supportive Therapy. Mood stable. Getting out less. Ruminating about wrongs from ex-wife from when together. Discussed importance of shifting focus, trying to change response to ruminations. Trying to get ex to acknowledge and have daughter, Anita, believe him. Discussed importance of shifting focus to improving relationship with daughter vs injustices of past. Patient agrees. Did have dialogue with oldest daughter to reconcile, hopes to meet with her in future when her  is not around. Working to help youngest get car for child. Discussed his increasing social engagement. Next: 1 week .

## 2024-07-12 ENCOUNTER — OFFICE VISIT (OUTPATIENT)
Dept: PRIMARY CARE | Facility: CLINIC | Age: 79
End: 2024-07-12
Payer: MEDICARE

## 2024-07-12 ENCOUNTER — APPOINTMENT (OUTPATIENT)
Dept: PRIMARY CARE | Facility: CLINIC | Age: 79
End: 2024-07-12
Payer: MEDICARE

## 2024-07-12 VITALS
TEMPERATURE: 97.3 F | SYSTOLIC BLOOD PRESSURE: 148 MMHG | BODY MASS INDEX: 39.16 KG/M2 | DIASTOLIC BLOOD PRESSURE: 78 MMHG | WEIGHT: 265 LBS

## 2024-07-12 DIAGNOSIS — Z12.12 SCREENING FOR COLORECTAL CANCER: ICD-10-CM

## 2024-07-12 DIAGNOSIS — E11.9 TYPE 2 DIABETES MELLITUS WITHOUT COMPLICATION, WITHOUT LONG-TERM CURRENT USE OF INSULIN (MULTI): ICD-10-CM

## 2024-07-12 DIAGNOSIS — I48.0 PAROXYSMAL ATRIAL FIBRILLATION (MULTI): ICD-10-CM

## 2024-07-12 DIAGNOSIS — Z23 NEED FOR VACCINATION: ICD-10-CM

## 2024-07-12 DIAGNOSIS — I10 ESSENTIAL HYPERTENSION: ICD-10-CM

## 2024-07-12 DIAGNOSIS — E78.5 HYPERLIPIDEMIA, UNSPECIFIED HYPERLIPIDEMIA TYPE: ICD-10-CM

## 2024-07-12 DIAGNOSIS — Z12.11 SCREENING FOR COLORECTAL CANCER: ICD-10-CM

## 2024-07-12 DIAGNOSIS — Z00.00 ROUTINE GENERAL MEDICAL EXAMINATION AT HEALTH CARE FACILITY: Primary | ICD-10-CM

## 2024-07-12 DIAGNOSIS — Z95.2 S/P AORTIC VALVE REPLACEMENT: ICD-10-CM

## 2024-07-12 ASSESSMENT — ENCOUNTER SYMPTOMS
ARTHRALGIAS: 0
FREQUENCY: 0
MYALGIAS: 0
WHEEZING: 0
NAUSEA: 0
VOMITING: 0
DIARRHEA: 1
WEAKNESS: 0
TROUBLE SWALLOWING: 0
DEPRESSION: 1
BACK PAIN: 1
HEADACHES: 0
BLOOD IN STOOL: 0
FATIGUE: 1
WOUND: 0
SINUS PAIN: 0
COUGH: 0
DIZZINESS: 0
SLEEP DISTURBANCE: 1
SHORTNESS OF BREATH: 0
NUMBNESS: 0
PALPITATIONS: 0
ADENOPATHY: 0
FEVER: 0
NERVOUS/ANXIOUS: 0
RHINORRHEA: 0
HEMATURIA: 0
DYSPHORIC MOOD: 1
SORE THROAT: 0
VOICE CHANGE: 0
ABDOMINAL PAIN: 0
DYSURIA: 0
LOSS OF SENSATION IN FEET: 0
OCCASIONAL FEELINGS OF UNSTEADINESS: 0
CONSTIPATION: 0

## 2024-07-12 ASSESSMENT — ACTIVITIES OF DAILY LIVING (ADL)
BATHING: INDEPENDENT
DOING_HOUSEWORK: INDEPENDENT
MANAGING_FINANCES: INDEPENDENT
TAKING_MEDICATION: INDEPENDENT
GROCERY_SHOPPING: INDEPENDENT
DRESSING: INDEPENDENT

## 2024-07-12 NOTE — PROGRESS NOTES
Subjective   Reason for Visit: Silvestre Aranda is an 79 y.o. male here for a Medicare Wellness visit.     Past Medical, Surgical, and Family History reviewed and updated in chart.    Reviewed all medications by prescribing practitioner or clinical pharmacist (such as prescriptions, OTCs, herbal therapies and supplements) and documented in the medical record.    No hospitalizations or surgeries in the last year.  HPI  Tobacco Use: Medium Risk (7/12/2024)    Patient History     Smoking Tobacco Use: Former     Smokeless Tobacco Use: Never     Passive Exposure: Not on file   Quit smoking in 1990.  No alcohol.  No drug use.     He has brought in POA/living will.    Patient Care Team:  Lexa Garcai DO as PCP - General  Lexa Garcia DO as PCP - [employer] PCP     Review of Systems   Constitutional:  Positive for fatigue. Negative for fever.   HENT:  Negative for congestion, rhinorrhea, sinus pain, sore throat, trouble swallowing and voice change.    Respiratory:  Negative for cough, shortness of breath and wheezing.    Cardiovascular:  Negative for chest pain, palpitations and leg swelling.   Gastrointestinal:  Positive for diarrhea. Negative for abdominal pain, blood in stool, constipation, nausea and vomiting.   Genitourinary:  Negative for dysuria, frequency and hematuria.   Musculoskeletal:  Positive for back pain. Negative for arthralgias and myalgias.   Skin:  Negative for rash and wound.        No  moles growing or changing.   Neurological:  Negative for dizziness, syncope, weakness, numbness and headaches.   Hematological:  Negative for adenopathy.   Psychiatric/Behavioral:  Positive for dysphoric mood and sleep disturbance. Negative for self-injury and suicidal ideas. The patient is not nervous/anxious.        Objective   Vitals:  /78 (BP Location: Right arm, Patient Position: Sitting)   Temp 36.3 °C (97.3 °F)   Wt 120 kg (265 lb)   BMI 39.16 kg/m²       Physical Exam  Vitals  reviewed.   Constitutional:       General: He is not in acute distress.     Appearance: Normal appearance. He is not ill-appearing or toxic-appearing.   HENT:      Head: Normocephalic and atraumatic.      Right Ear: Tympanic membrane, ear canal and external ear normal.      Left Ear: Tympanic membrane, ear canal and external ear normal.      Nose: Nose normal.      Mouth/Throat:      Mouth: Mucous membranes are moist.   Eyes:      Extraocular Movements: Extraocular movements intact.      Conjunctiva/sclera: Conjunctivae normal.      Pupils: Pupils are equal, round, and reactive to light.   Cardiovascular:      Rate and Rhythm: Normal rate and regular rhythm.      Heart sounds: No murmur heard.  Pulmonary:      Effort: Pulmonary effort is normal.      Breath sounds: Normal breath sounds.   Abdominal:      General: Bowel sounds are normal. There is no distension.      Palpations: Abdomen is soft. There is no mass.      Tenderness: There is no abdominal tenderness. There is no guarding or rebound.   Musculoskeletal:         General: No tenderness.      Cervical back: Neck supple.      Right lower leg: No edema.      Left lower leg: No edema.   Skin:     Coloration: Skin is not jaundiced or pale.      Findings: No rash.   Neurological:      General: No focal deficit present.      Mental Status: He is alert and oriented to person, place, and time. Mental status is at baseline.   Psychiatric:         Mood and Affect: Mood normal.         Behavior: Behavior normal.         Thought Content: Thought content normal.         Judgment: Judgment normal.     He lives in the same building as his daughter.      Assessment/Plan   Problem List Items Addressed This Visit    None    Annual Wellness exam completed   Preventive Health history reviewed:  Labs ordered    Low dose CT for lung cancer screening not indicated.  Quit smoking in 1990.  Prostate cancer screening ordered  Cscope declines d/t high risk on blood thinners.  Last  colonoscopy was about ten years ago.   Depression Screening done  Advanced Directives Discussion Completed  Cardiovascular risk discussed and if needed, lifestyle modifications recommended, including nutritional choices, exercise, and elimination of habits contributing to risk.  We agreed on a plan to reduce the current cardiovascular risk.  See ecalc ASCVD Risk  Plus for data discussed regarding risk and risk reduction opportunities.  Aspirin use is not recommended after reviewing the updated guidelines. Weight loss is recommended.    Immunizations:  Influenza recommended in the fall.   Prevnar 20 ordered.  Prevnar 13 done 2016  Pneumovax 23 done 2020  Shingrix completed 2021.    I will order labs.  Continue with the same medication.  I will order a Prevnar 20 vaccine today.  A Cologuard was ordered today.  Return in six months and sooner if there are any problem

## 2024-07-12 NOTE — PATIENT INSTRUCTIONS
I will order labs.  Continue with the same medication.  I will order a Prevnar 20 vaccine today.  A Cologuard was ordered today.  Return in six months and sooner if there are any problem

## 2024-07-16 DIAGNOSIS — N40.0 BENIGN PROSTATIC HYPERPLASIA, UNSPECIFIED WHETHER LOWER URINARY TRACT SYMPTOMS PRESENT: ICD-10-CM

## 2024-07-16 DIAGNOSIS — I10 ESSENTIAL HYPERTENSION: ICD-10-CM

## 2024-07-16 DIAGNOSIS — F32.4 MAJOR DEPRESSIVE DISORDER WITH SINGLE EPISODE, IN PARTIAL REMISSION (CMS-HCC): ICD-10-CM

## 2024-07-16 DIAGNOSIS — E78.5 HYPERLIPIDEMIA, UNSPECIFIED HYPERLIPIDEMIA TYPE: ICD-10-CM

## 2024-07-16 DIAGNOSIS — I48.0 PAROXYSMAL ATRIAL FIBRILLATION (MULTI): ICD-10-CM

## 2024-07-16 RX ORDER — SERTRALINE HYDROCHLORIDE 50 MG/1
50 TABLET, FILM COATED ORAL DAILY
Qty: 90 TABLET | Refills: 1 | Status: SHIPPED | OUTPATIENT
Start: 2024-07-16 | End: 2025-01-12

## 2024-07-16 RX ORDER — ATORVASTATIN CALCIUM 20 MG/1
20 TABLET, FILM COATED ORAL DAILY
Qty: 100 TABLET | Refills: 0 | Status: SHIPPED | OUTPATIENT
Start: 2024-07-16

## 2024-07-16 RX ORDER — AMLODIPINE BESYLATE 5 MG/1
5 TABLET ORAL DAILY
Qty: 100 TABLET | Refills: 0 | Status: SHIPPED | OUTPATIENT
Start: 2024-07-16

## 2024-07-16 RX ORDER — WARFARIN 1 MG/1
TABLET ORAL
Qty: 208 TABLET | Refills: 3 | Status: SHIPPED | OUTPATIENT
Start: 2024-07-16

## 2024-07-16 RX ORDER — TAMSULOSIN HYDROCHLORIDE 0.4 MG/1
0.4 CAPSULE ORAL DAILY
Qty: 100 CAPSULE | Refills: 0 | Status: SHIPPED | OUTPATIENT
Start: 2024-07-16

## 2024-07-16 NOTE — TELEPHONE ENCOUNTER
Pt is calling stating that he needs refills on these medications. A couple of these medication have refills but pt kept insisting that he needs refills and asked that I send a message.    REFILL  MEDICATION:     Sertraline 50 MG; Take 1 tablet once daily.    LR: 4/12/24      90 tablets with 1 refill    Warfarin 1 MG; Take 3 tablets every Monday AM&PM; Thursday AM&PM; Take 2 tablets the rest of the week or as directed.    LR: 11/20/23       208 tablets with 3 refills     Tamsulosin 0.4 MG; Take 1 capsule once daily.    LR: 5/14/24      100 capsules with 0 refills     Amlodipine 5 MG; Take 1 tablet once daily.    LR: 5/14/24       100 tablets with 0 refills     Atorvastatin 20 MG; Take 1 tablet once daily.    LR: 5/14/24      100 tablets with 0 refills     PHARM: [facility]     LV: 7/12/24  NV: 1/13/25

## 2024-07-17 ENCOUNTER — APPOINTMENT (OUTPATIENT)
Dept: BEHAVIORAL HEALTH | Facility: CLINIC | Age: 79
End: 2024-07-17
Payer: MEDICARE

## 2024-07-17 DIAGNOSIS — F33.0 MILD EPISODE OF RECURRENT MAJOR DEPRESSIVE DISORDER (CMS-HCC): ICD-10-CM

## 2024-07-17 PROCEDURE — 90834 PSYTX W PT 45 MINUTES: CPT | Performed by: PSYCHOLOGIST

## 2024-07-17 PROCEDURE — 1157F ADVNC CARE PLAN IN RCRD: CPT | Performed by: PSYCHOLOGIST
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
## 2024-07-25 ENCOUNTER — APPOINTMENT (OUTPATIENT)
Dept: BEHAVIORAL HEALTH | Facility: CLINIC | Age: 79
End: 2024-07-25
Payer: MEDICARE

## 2024-07-25 DIAGNOSIS — F33.0 MILD EPISODE OF RECURRENT MAJOR DEPRESSIVE DISORDER (CMS-HCC): ICD-10-CM

## 2024-07-25 PROCEDURE — 90837 PSYTX W PT 60 MINUTES: CPT | Performed by: PSYCHOLOGIST

## 2024-07-25 PROCEDURE — 1157F ADVNC CARE PLAN IN RCRD: CPT | Performed by: PSYCHOLOGIST
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
## 2024-08-01 ENCOUNTER — APPOINTMENT (OUTPATIENT)
Dept: BEHAVIORAL HEALTH | Facility: CLINIC | Age: 79
End: 2024-08-01
Payer: MEDICARE

## 2024-08-08 ENCOUNTER — APPOINTMENT (OUTPATIENT)
Dept: BEHAVIORAL HEALTH | Facility: CLINIC | Age: 79
End: 2024-08-08
Payer: MEDICARE

## 2024-08-22 ENCOUNTER — APPOINTMENT (OUTPATIENT)
Dept: DERMATOLOGY | Facility: CLINIC | Age: 79
End: 2024-08-22
Payer: MEDICARE

## 2024-08-22 DIAGNOSIS — L21.9 SEBORRHEIC DERMATITIS: Primary | ICD-10-CM

## 2024-08-22 DIAGNOSIS — L40.0 PSORIASIS VULGARIS: ICD-10-CM

## 2024-08-22 PROCEDURE — 1036F TOBACCO NON-USER: CPT | Performed by: NURSE PRACTITIONER

## 2024-08-22 PROCEDURE — 1159F MED LIST DOCD IN RCRD: CPT | Performed by: NURSE PRACTITIONER

## 2024-08-22 PROCEDURE — 99213 OFFICE O/P EST LOW 20 MIN: CPT | Performed by: NURSE PRACTITIONER

## 2024-08-22 PROCEDURE — 1157F ADVNC CARE PLAN IN RCRD: CPT | Performed by: NURSE PRACTITIONER

## 2024-08-22 RX ORDER — KETOCONAZOLE 20 MG/G
CREAM TOPICAL 2 TIMES DAILY
Qty: 60 G | Refills: 2 | Status: SHIPPED | OUTPATIENT
Start: 2024-08-22 | End: 2024-11-20

## 2024-08-22 NOTE — PROGRESS NOTES
Subjective     Silvestre Aranda is a 79 y.o. male who presents for the following: Psoriasis.   Established patient in for 6 month check up patient currently taking skyrizi, and prescribed clobetasol.   Patient due for T-spot.     Review of Systems:  No other skin or systemic complaints other than what is documented elsewhere in the note.    The following portions of the chart were reviewed this encounter and updated as appropriate:       Skin Cancer History  No skin cancer on file.    Specialty Problems          Dermatology Problems    Onychomycosis of toenail    Psoriasis    Skin lesion of face    Tinea cruris    Eczema     Past Medical History:  Silvestre Aranda  has a past medical history of Arthritis, Basal cell carcinoma (03/2020), Cold sore, Crohn disease (Multi), Depression, Eczema, Headache (1969), Hyperlipidemia, unspecified, Hypertension, Other conditions influencing health status, Other conditions influencing health status, Psoriasis, PTSD (post-traumatic stress disorder), Sleep disorder, Unspecified atrial fibrillation (Multi), and Vascular disease.    Past Surgical History:  Silvestre Aranda  has a past surgical history that includes Total hip arthroplasty (04/28/2014); Hernia repair (04/28/2014); Other surgical history (08/27/2013); and Aortic valve replacement (08/27/2013).    Family History:  Patient family history includes Colon cancer in his mother; Heart disease in his father.    Social History:  Silvestre Aranda  reports that he quit smoking about 19 years ago. His smoking use included cigarettes. He has a 57 pack-year smoking history. He has never used smokeless tobacco. He reports that he does not currently use alcohol. He reports that he does not currently use drugs after having used the following drugs: Hashish.    Allergies:  Erythromycin and Scallops    Current Medications / CAM's:    Current Outpatient Medications:     acetaminophen (TylenoL) 325 mg tablet, Take 2 tablets (650 mg) by mouth every 6  hours., Disp: , Rfl:     amLODIPine (Norvasc) 5 mg tablet, Take 1 tablet (5 mg) by mouth once daily., Disp: 100 tablet, Rfl: 0    atorvastatin (Lipitor) 20 mg tablet, Take 1 tablet (20 mg) by mouth once daily., Disp: 100 tablet, Rfl: 0    fenofibrate (Tricor) 145 mg tablet, Take 1 tablet (145 mg) by mouth once daily., Disp: 90 tablet, Rfl: 3    gabapentin (Neurontin) 600 mg tablet, 2 (two) tablets every morning and 2 (two) tablets every night., Disp: 360 tablet, Rfl: 0    ketoconazole (NIZOral) 2 % cream, Apply topically 2 times a day., Disp: 60 g, Rfl: 2    lisinopril 40 mg tablet, Take 1 tablet (40 mg) by mouth once daily., Disp: 90 tablet, Rfl: 3    metFORMIN (Glucophage) 500 mg tablet, TAKE 1 TABLET BY MOUTH TWICE  DAILY WITH MEALS, Disp: 200 tablet, Rfl: 2    metoprolol succinate XL (Toprol-XL) 100 mg 24 hr tablet, Take 1 tablet (100 mg) by mouth once daily. Do not crush or chew., Disp: 90 tablet, Rfl: 3    risankizumab-rzaa (Skyrizi) 150 mg/mL pen injector pen, Inject 1 mL (150 mg) under the skin every 12 weeks., Disp: 1 mL, Rfl: 1    sertraline (Zoloft) 50 mg tablet, Take 1 tablet (50 mg) by mouth once daily., Disp: 90 tablet, Rfl: 1    sotalol (Betapace) 80 mg tablet, TAKE 1 TABLET BY MOUTH TWICE  DAILY, Disp: 180 tablet, Rfl: 3    tamsulosin (Flomax) 0.4 mg 24 hr capsule, Take 1 capsule (0.4 mg) by mouth once daily., Disp: 100 capsule, Rfl: 0    venlafaxine XR (Effexor-XR) 150 mg 24 hr capsule, TAKE 1 CAPSULE BY MOUTH ONCE  DAILY WITH FOOD, Disp: 90 capsule, Rfl: 3    warfarin (Coumadin) 1 mg tablet, Take as directed per After Visit Summary., Disp: 208 tablet, Rfl: 3     Objective   Well appearing patient in no apparent distress; mood and affect are within normal limits.      Assessment/Plan   1. Seborrheic dermatitis  Left Alar Crease, Left Ear, Right Ala Nasi, Right Preauricular Area  Erythema with overlying greasy scale.    Plan: Counseling.  I counseled the patient regarding the following:  Skin care:  Emollients, shampoos with tar,selenium or zinc pyrithione can improve seborrheic dermatitis.    Expectations: Seborrheic  Dermatitis is chronic in nature with periods of remissions and flares. Flares can be triggered by stress.  Contact office if: Seborrheic dermatitis worsens, or fails to improve despite several months of treatment.    I discussed with the patient that prolonged use of topical steroids can result in the increased appearance of superficial blood vessels (telangiectasias), lightening (hypopigmentation) and thinning of  the skin (atrophy).       Patient understands to avoid using high potency steroids in skin folds, the groin or the face.  The patient verbalized understanding of the proper use and possible adverse effects of topical steroids.      All of the patient's questions and concerns were addressed.    PLAN:  Ketoconazole 2% shampoo 1-2 times a week increasing and decreasing as needed      Related Medications  ketoconazole (NIZOral) 2 % cream  Apply topically 2 times a day.    2. Psoriasis vulgaris  Clear on exam    Discussed with patient:   There is no known cause of psoriasis.  It may be caused by a combination of immune, genetic, and environmental factors.   Medications, physical or emotional stress and infections may cause flares.   Smoking can worsen psoriasis, especially the palms and soles.     Please tell your provider if you have:   Worsening of redness, scale or itching   Rashes on your scalp, genitals, or skin folds   Joint pain, swelling, or stiffness   Nail changes    Counseled patient regarding the chronic nature of this condition. Discussed current and/or newly prescribed medications as well as reasons to call office prior to next visit (new plaques or flares, new joint pain, etc).     Addressed concerns and questions regarding the treatment and plan with patient in office today.     Risks, benefits, side effects, alternatives and options were discussed with patient and the  patient voiced understanding.      PLAN:  Continue Skyrizi 150mg every 12 weeks  Quantiferon ordered in 7/2023, but never drawn because patient was told by [facility] that he didn't need it.  Will reorder.  Can use clobetasol 0.05% cream twice daily    risankizumab-rzaa (Skyrizi) 150 mg/mL pen injector pen  Inject 1 mL (150 mg) under the skin every 12 weeks.    Related Procedures  Follow Up In Dermatology - Established Patient

## 2024-08-23 ENCOUNTER — TELEPHONE (OUTPATIENT)
Dept: PRIMARY CARE | Facility: CLINIC | Age: 79
End: 2024-08-23
Payer: MEDICARE

## 2024-08-23 DIAGNOSIS — E87.5 HYPERKALEMIA: Primary | ICD-10-CM

## 2024-08-23 NOTE — TELEPHONE ENCOUNTER
Potassium is elevated from labs done at [facility] 8/22/2024. Please have this repeated. BMP ordered.

## 2024-08-25 DIAGNOSIS — M48.061 SPINAL STENOSIS OF LUMBAR REGION, UNSPECIFIED WHETHER NEUROGENIC CLAUDICATION PRESENT: ICD-10-CM

## 2024-08-26 RX ORDER — GABAPENTIN 600 MG/1
TABLET ORAL
Qty: 360 TABLET | Refills: 3 | Status: SHIPPED | OUTPATIENT
Start: 2024-08-26

## 2024-10-06 DIAGNOSIS — I10 ESSENTIAL HYPERTENSION: ICD-10-CM

## 2024-10-06 DIAGNOSIS — N40.0 BENIGN PROSTATIC HYPERPLASIA, UNSPECIFIED WHETHER LOWER URINARY TRACT SYMPTOMS PRESENT: ICD-10-CM

## 2024-10-07 RX ORDER — TAMSULOSIN HYDROCHLORIDE 0.4 MG/1
0.4 CAPSULE ORAL DAILY
Qty: 100 CAPSULE | Refills: 2 | Status: SHIPPED | OUTPATIENT
Start: 2024-10-07

## 2024-10-07 RX ORDER — AMLODIPINE BESYLATE 5 MG/1
5 TABLET ORAL DAILY
Qty: 100 TABLET | Refills: 2 | Status: SHIPPED | OUTPATIENT
Start: 2024-10-07

## 2024-10-21 ENCOUNTER — TELEPHONE (OUTPATIENT)
Dept: PRIMARY CARE | Facility: CLINIC | Age: 79
End: 2024-10-21
Payer: MEDICARE

## 2024-10-21 DIAGNOSIS — E78.5 HYPERLIPIDEMIA, UNSPECIFIED HYPERLIPIDEMIA TYPE: ICD-10-CM

## 2024-10-21 RX ORDER — ATORVASTATIN CALCIUM 20 MG/1
20 TABLET, FILM COATED ORAL DAILY
Qty: 100 TABLET | Refills: 0 | Status: SHIPPED | OUTPATIENT
Start: 2024-10-21

## 2024-10-21 NOTE — TELEPHONE ENCOUNTER
Pt is requesting a refill on    Atorvastatin 20 mg 1 po every day  LR  7/16/24 # 100    [facility]  [phone]    LV 7/25/2024  NV 1/13/25

## 2024-10-31 DIAGNOSIS — I10 PRIMARY HYPERTENSION: ICD-10-CM

## 2024-11-01 RX ORDER — LISINOPRIL 40 MG/1
40 TABLET ORAL DAILY
Qty: 100 TABLET | Refills: 2 | Status: SHIPPED | OUTPATIENT
Start: 2024-11-01

## 2024-11-24 DIAGNOSIS — F32.4 MAJOR DEPRESSIVE DISORDER WITH SINGLE EPISODE, IN PARTIAL REMISSION (CMS-HCC): ICD-10-CM

## 2024-11-25 RX ORDER — SERTRALINE HYDROCHLORIDE 50 MG/1
50 TABLET, FILM COATED ORAL DAILY
Qty: 90 TABLET | Refills: 3 | Status: SHIPPED | OUTPATIENT
Start: 2024-11-25

## 2024-12-12 ENCOUNTER — TELEPHONE (OUTPATIENT)
Dept: PRIMARY CARE | Facility: CLINIC | Age: 79
End: 2024-12-12
Payer: MEDICARE

## 2024-12-12 NOTE — TELEPHONE ENCOUNTER
"Spoke with patient in regards to INR and advised him to continue current Coumadin dosage and recheck INR in one week.  Patient is agreeable to re-checking INR in one week but wanted to let you know that the fluctuation in INR readings are due to his diet and \"how much greens that he eats.\"  He states that he feels it is more beneficial to check INR every three weeks, rather than weekly.  "

## 2024-12-20 DIAGNOSIS — I10 ESSENTIAL HYPERTENSION: ICD-10-CM

## 2024-12-20 DIAGNOSIS — E78.5 HYPERLIPIDEMIA, UNSPECIFIED HYPERLIPIDEMIA TYPE: ICD-10-CM

## 2024-12-23 RX ORDER — METOPROLOL SUCCINATE 100 MG/1
100 TABLET, EXTENDED RELEASE ORAL DAILY
Qty: 100 TABLET | Refills: 2 | Status: SHIPPED | OUTPATIENT
Start: 2024-12-23

## 2024-12-23 RX ORDER — FENOFIBRATE 145 MG/1
145 TABLET, FILM COATED ORAL DAILY
Qty: 100 TABLET | Refills: 2 | Status: SHIPPED | OUTPATIENT
Start: 2024-12-23

## 2024-12-30 ENCOUNTER — TELEPHONE (OUTPATIENT)
Dept: PRIMARY CARE | Facility: CLINIC | Age: 79
End: 2024-12-30
Payer: MEDICARE

## 2024-12-30 NOTE — TELEPHONE ENCOUNTER
"Patient called complaining of severe pain and asking for medication dosage change.  He was very argumentative and rude.  He was informed several times that Dr. Garcia is out of the office until Friday and the covering physician was completely booked.  Patient was referred to Urgent Care or ER and refused stating that we needed to send a message to Dr. Adenike Sweet if BH is out.  I informed patient that Dr. Jeong is also out of the office and Dr. Ar Harry is covering.  I again advised patient that Dr. Harry recommends ER or Urgent Care and he states \"HOW CAN I GO TO EITHER OF THOSE PLACES WHEN I CAN'T WALK?\"  I advised patient that if symptoms are that bad then he should call the squad to be transported to the ER.  Patient became more upset and argumentative and call was disconnected.  (Patient was transferred to me after being uncooperative and rude to Louisa.)  "

## 2025-01-06 ENCOUNTER — TELEPHONE (OUTPATIENT)
Dept: PRIMARY CARE | Facility: CLINIC | Age: 80
End: 2025-01-06
Payer: MEDICARE

## 2025-01-06 DIAGNOSIS — E11.9 TYPE 2 DIABETES MELLITUS WITHOUT COMPLICATION, WITHOUT LONG-TERM CURRENT USE OF INSULIN (MULTI): ICD-10-CM

## 2025-01-06 RX ORDER — METFORMIN HYDROCHLORIDE 500 MG/1
500 TABLET ORAL
Qty: 200 TABLET | Refills: 2 | Status: SHIPPED | OUTPATIENT
Start: 2025-01-06

## 2025-01-06 NOTE — TELEPHONE ENCOUNTER
REFILL  MEDICATION:     Metformin 500 MG; Take 1 tablet twice daily with meals.    PHARM: [facility]    LR: 6/20/24      200 tablets with 0 refills   LV: 7/12/24  NV: 1/13/25

## 2025-01-13 ENCOUNTER — APPOINTMENT (OUTPATIENT)
Dept: PRIMARY CARE | Facility: CLINIC | Age: 80
End: 2025-01-13
Payer: MEDICARE

## 2025-01-15 DIAGNOSIS — I10 ESSENTIAL HYPERTENSION: ICD-10-CM

## 2025-01-16 RX ORDER — SOTALOL HYDROCHLORIDE 80 MG/1
80 TABLET ORAL 2 TIMES DAILY
Qty: 200 TABLET | Refills: 2 | Status: SHIPPED | OUTPATIENT
Start: 2025-01-16

## 2025-01-21 ENCOUNTER — TELEPHONE (OUTPATIENT)
Dept: DERMATOLOGY | Facility: CLINIC | Age: 80
End: 2025-01-21
Payer: MEDICARE

## 2025-01-21 DIAGNOSIS — L40.0 PSORIASIS VULGARIS: ICD-10-CM

## 2025-01-21 NOTE — TELEPHONE ENCOUNTER
"Patient called and LM that he is having some back and forth with Skyrizi about his delivery and he was told that the script sent from the fax was \"unintelligible and written in scribbles\" and that they need anew one sent. The patient was last sent a prescription electronically. Please advise.   "

## 2025-01-22 DIAGNOSIS — L40.0 PSORIASIS VULGARIS: ICD-10-CM

## 2025-01-22 NOTE — PROGRESS NOTES
Skyrizi refills sent to  Specialty. Patient filling with [facility]. Clinical pharmacist routed Skyrizi prescription to [facility].

## 2025-01-23 DIAGNOSIS — I48.0 PAROXYSMAL ATRIAL FIBRILLATION (MULTI): ICD-10-CM

## 2025-01-23 RX ORDER — WARFARIN 1 MG/1
TABLET ORAL
Start: 2025-01-23

## 2025-01-31 ENCOUNTER — APPOINTMENT (OUTPATIENT)
Dept: PRIMARY CARE | Facility: CLINIC | Age: 80
End: 2025-01-31
Payer: MEDICARE

## 2025-01-31 VITALS
SYSTOLIC BLOOD PRESSURE: 152 MMHG | TEMPERATURE: 97.6 F | WEIGHT: 258 LBS | BODY MASS INDEX: 39.1 KG/M2 | DIASTOLIC BLOOD PRESSURE: 70 MMHG | HEIGHT: 68 IN

## 2025-01-31 DIAGNOSIS — I11.0 HYPERTENSIVE HEART DISEASE WITH HEART FAILURE: Primary | ICD-10-CM

## 2025-01-31 DIAGNOSIS — Z12.5 PROSTATE CANCER SCREENING: ICD-10-CM

## 2025-01-31 DIAGNOSIS — E11.9 TYPE 2 DIABETES MELLITUS WITHOUT COMPLICATION, WITHOUT LONG-TERM CURRENT USE OF INSULIN (MULTI): ICD-10-CM

## 2025-01-31 DIAGNOSIS — F33.1 MODERATE EPISODE OF RECURRENT MAJOR DEPRESSIVE DISORDER: ICD-10-CM

## 2025-01-31 DIAGNOSIS — Z00.00 ROUTINE GENERAL MEDICAL EXAMINATION AT HEALTH CARE FACILITY: ICD-10-CM

## 2025-01-31 DIAGNOSIS — I48.0 PAROXYSMAL ATRIAL FIBRILLATION (MULTI): ICD-10-CM

## 2025-01-31 PROCEDURE — G0439 PPPS, SUBSEQ VISIT: HCPCS | Performed by: FAMILY MEDICINE

## 2025-01-31 PROCEDURE — 1036F TOBACCO NON-USER: CPT | Performed by: FAMILY MEDICINE

## 2025-01-31 PROCEDURE — 1170F FXNL STATUS ASSESSED: CPT | Performed by: FAMILY MEDICINE

## 2025-01-31 PROCEDURE — 3078F DIAST BP <80 MM HG: CPT | Performed by: FAMILY MEDICINE

## 2025-01-31 PROCEDURE — 1123F ACP DISCUSS/DSCN MKR DOCD: CPT | Performed by: FAMILY MEDICINE

## 2025-01-31 PROCEDURE — 99397 PER PM REEVAL EST PAT 65+ YR: CPT | Performed by: FAMILY MEDICINE

## 2025-01-31 PROCEDURE — 1157F ADVNC CARE PLAN IN RCRD: CPT | Performed by: FAMILY MEDICINE

## 2025-01-31 PROCEDURE — 1159F MED LIST DOCD IN RCRD: CPT | Performed by: FAMILY MEDICINE

## 2025-01-31 PROCEDURE — 3077F SYST BP >= 140 MM HG: CPT | Performed by: FAMILY MEDICINE

## 2025-01-31 PROCEDURE — 1158F ADVNC CARE PLAN TLK DOCD: CPT | Performed by: FAMILY MEDICINE

## 2025-01-31 PROCEDURE — 1160F RVW MEDS BY RX/DR IN RCRD: CPT | Performed by: FAMILY MEDICINE

## 2025-01-31 ASSESSMENT — ACTIVITIES OF DAILY LIVING (ADL)
GROCERY_SHOPPING: INDEPENDENT
BATHING: INDEPENDENT
TAKING_MEDICATION: INDEPENDENT
DRESSING: INDEPENDENT
MANAGING_FINANCES: INDEPENDENT
DOING_HOUSEWORK: INDEPENDENT

## 2025-01-31 ASSESSMENT — ENCOUNTER SYMPTOMS
CONSTIPATION: 0
ADENOPATHY: 0
SHORTNESS OF BREATH: 0
BLOOD IN STOOL: 0
SLEEP DISTURBANCE: 1
COUGH: 0
OCCASIONAL FEELINGS OF UNSTEADINESS: 1
SORE THROAT: 0
PALPITATIONS: 0
DEPRESSION: 0
DIZZINESS: 0
DIARRHEA: 0
VOMITING: 0
HEADACHES: 0
ARTHRALGIAS: 0
LOSS OF SENSATION IN FEET: 0
NUMBNESS: 1
VOICE CHANGE: 0
WHEEZING: 0
ROS SKIN COMMENTS: NO MOLES GROWING OR CHANGING.
SINUS PAIN: 0
NERVOUS/ANXIOUS: 0
NAUSEA: 0
WOUND: 0
MYALGIAS: 0
TROUBLE SWALLOWING: 0
FREQUENCY: 0
RHINORRHEA: 0
FEVER: 0
HEMATURIA: 0
ABDOMINAL PAIN: 0
BACK PAIN: 1
FATIGUE: 1
WEAKNESS: 0
DYSURIA: 0
DYSPHORIC MOOD: 1

## 2025-01-31 ASSESSMENT — PATIENT HEALTH QUESTIONNAIRE - PHQ9
2. FEELING DOWN, DEPRESSED OR HOPELESS: NOT AT ALL
SUM OF ALL RESPONSES TO PHQ9 QUESTIONS 1 AND 2: 0
1. LITTLE INTEREST OR PLEASURE IN DOING THINGS: NOT AT ALL

## 2025-01-31 NOTE — ASSESSMENT & PLAN NOTE
Orders:    Urinalysis with Reflex Microscopic; Future    Hemoglobin A1C; Future    Lipid Panel; Future    Comprehensive Metabolic Panel; Future

## 2025-01-31 NOTE — ASSESSMENT & PLAN NOTE
Orders:    Urinalysis with Reflex Microscopic; Future    Hemoglobin A1C; Future    Lipid Panel; Future    semaglutide 0.25 mg or 0.5 mg (2 mg/3 mL) pen injector; Inject 0.25 mg under the skin 1 (one) time per week.

## 2025-01-31 NOTE — PROGRESS NOTES
Subjective   Reason for Visit: Silvestre Aranda is an 80 y.o. male here for a Medicare Wellness visit.          Reviewed all medications by prescribing practitioner or clinical pharmacist (such as prescriptions, OTCs, herbal therapies and supplements) and documented in the medical record.    Current Outpatient Medications on File Prior to Visit   Medication Sig Dispense Refill    acetaminophen (TylenoL) 325 mg tablet Take 2 tablets (650 mg) by mouth every 6 hours.      amLODIPine (Norvasc) 5 mg tablet TAKE 1 TABLET BY MOUTH ONCE  DAILY 100 tablet 2    atorvastatin (Lipitor) 20 mg tablet Take 1 tablet (20 mg) by mouth once daily. 100 tablet 0    fenofibrate (Tricor) 145 mg tablet TAKE 1 TABLET BY MOUTH ONCE  DAILY 100 tablet 2    gabapentin (Neurontin) 600 mg tablet TAKE 2 TABLETS BY MOUTH EVERY  MORNING AND 2 TABLETS BY MOUTH  AT NIGHT 360 tablet 3    lisinopril 40 mg tablet TAKE 1 TABLET BY MOUTH ONCE  DAILY 100 tablet 2    metFORMIN (Glucophage) 500 mg tablet Take 1 tablet (500 mg) by mouth 2 times daily (morning and late afternoon). 200 tablet 2    metoprolol succinate XL (Toprol-XL) 100 mg 24 hr tablet TAKE 1 TABLET BY MOUTH ONCE  DAILY (DO NOT CRUSH OR CHEW) 100 tablet 2    risankizumab-rzaa (Skyrizi) 150 mg/mL pen injector pen Inject 1 mL (150 mg) under the skin every 12 weeks. 1 mL 0    sertraline (Zoloft) 50 mg tablet TAKE 1 TABLET BY MOUTH ONCE  DAILY 90 tablet 3    sotalol (Betapace) 80 mg tablet TAKE 1 TABLET BY MOUTH TWICE  DAILY 200 tablet 2    tamsulosin (Flomax) 0.4 mg 24 hr capsule TAKE 1 CAPSULE BY MOUTH ONCE  DAILY 100 capsule 2    venlafaxine XR (Effexor-XR) 150 mg 24 hr capsule TAKE 1 CAPSULE BY MOUTH ONCE  DAILY WITH FOOD 90 capsule 3    warfarin (Coumadin) 1 mg tablet Take as directed per After Visit Summary.  As of 1/23/25: take 3 mg q STTHS and 2 mg q MWF       No current facility-administered medications on file prior to visit.       HPI  Tobacco Use: Medium Risk (1/31/2025)    Patient History  "    Smoking Tobacco Use: Former     Smokeless Tobacco Use: Never     Passive Exposure: Not on file   He quit smoking in 2005.  No alcohol.  No drug use.    Full code.  POA would be daughter, Anita Nolan.      Patient Care Team:  Lexa Garcia DO as PCP - General  Lexa Garcia DO as PCP - [employer] PCP     Review of Systems   Constitutional:  Positive for fatigue. Negative for fever.   HENT:  Negative for congestion, rhinorrhea, sinus pain, sore throat, trouble swallowing and voice change.    Respiratory:  Negative for cough, shortness of breath and wheezing.    Cardiovascular:  Positive for leg swelling. Negative for chest pain and palpitations.   Gastrointestinal:  Negative for abdominal pain, blood in stool, constipation, diarrhea, nausea and vomiting.   Genitourinary:  Negative for dysuria, frequency and hematuria.   Musculoskeletal:  Positive for back pain. Negative for arthralgias and myalgias.   Skin:  Positive for rash (psoriasis). Negative for wound.        No moles growing or changing.   Neurological:  Positive for numbness. Negative for dizziness, syncope, weakness and headaches.   Hematological:  Negative for adenopathy.   Psychiatric/Behavioral:  Positive for dysphoric mood and sleep disturbance. Negative for self-injury and suicidal ideas. The patient is not nervous/anxious.        Objective   Vitals:  /70 (BP Location: Right arm, Patient Position: Sitting)   Temp 36.4 °C (97.6 °F) (Skin)   Ht 1.715 m (5' 7.5\")   Wt 117 kg (258 lb)   BMI 39.81 kg/m²       Physical Exam  Vitals reviewed.   Constitutional:       General: He is not in acute distress.     Appearance: Normal appearance. He is not ill-appearing or toxic-appearing.   HENT:      Head: Normocephalic and atraumatic.      Right Ear: Tympanic membrane, ear canal and external ear normal.      Left Ear: Tympanic membrane, ear canal and external ear normal.      Nose: Nose normal.      Mouth/Throat:      Mouth: " Mucous membranes are moist.   Eyes:      Extraocular Movements: Extraocular movements intact.      Conjunctiva/sclera: Conjunctivae normal.      Pupils: Pupils are equal, round, and reactive to light.   Cardiovascular:      Rate and Rhythm: Normal rate and regular rhythm.      Heart sounds: No murmur heard.  Pulmonary:      Effort: Pulmonary effort is normal.      Breath sounds: Normal breath sounds.   Abdominal:      General: Bowel sounds are normal. There is no distension.      Palpations: Abdomen is soft. There is no mass.      Tenderness: There is no abdominal tenderness. There is no guarding or rebound.   Musculoskeletal:         General: No tenderness.      Cervical back: Neck supple.      Right lower leg: Edema present.      Left lower leg: Edema present.      Comments: 1+ edema bilaterally.      Walks with a walker.    Skin:     Coloration: Skin is not jaundiced or pale.      Findings: No rash.   Neurological:      General: No focal deficit present.      Mental Status: He is alert and oriented to person, place, and time. Mental status is at baseline.   Psychiatric:         Behavior: Behavior normal.         Thought Content: Thought content normal.         Judgment: Judgment normal.      Comments: Depressed affect.         Assessment & Plan  Hypertensive heart disease with heart failure    Orders:    Urinalysis with Reflex Microscopic; Future    Hemoglobin A1C; Future    Lipid Panel; Future    Comprehensive Metabolic Panel; Future    Type 2 diabetes mellitus without complication, without long-term current use of insulin (Multi)    Orders:    Urinalysis with Reflex Microscopic; Future    Hemoglobin A1C; Future    Lipid Panel; Future    semaglutide 0.25 mg or 0.5 mg (2 mg/3 mL) pen injector; Inject 0.25 mg under the skin 1 (one) time per week.    Paroxysmal atrial fibrillation (Multi)    Orders:    Comprehensive Metabolic Panel; Future    CBC and Auto Differential; Future    Moderate episode of recurrent major  depressive disorder         Body mass index (BMI) 40.0-44.9, adult (Multi)            Annual Wellness exam completed   Preventive Health history reviewed:  Labs ordered    Low dose CT for lung cancer screening not indicated.  Stopped smoking more than 15 years ago.   Prostate cancer screening ordered  Cscope--last one just over ten years ago.  Recommended every ten years.  He declines this.   Depression Screening done  Advanced Directives Discussion Completed  Cardiovascular risk discussed and if needed, lifestyle modifications recommended, including nutritional choices, exercise, and elimination of habits contributing to risk.  We agreed on a plan to reduce the current cardiovascular risk.  See ecalc ASCVD Risk  Plus for data discussed regarding risk and risk reduction opportunities.  Aspirin use is not recommended after reviewing the updated guidelines. He remains on coumadin.    The 10-year ASCVD risk score (Hayley CUADRA, et al., 2019) is: 67.5%    Values used to calculate the score:      Age: 80 years      Sex: Male      Is Non- : No      Diabetic: Yes      Tobacco smoker: No      Systolic Blood Pressure: 152 mmHg      Is BP treated: Yes      HDL Cholesterol: 38.7 mg/dL      Total Cholesterol: 180 mg/dL    Immunizations:  Influenza recommended yearly in the fall.  Prevnar 20 done 2024  Prevnar 13done 2016  Pneumovax 23 done 2020  Shingrix completed 2021  RSV vaccine recommended at the pharmacy.       I ordered labs to be done fasting at [address]  I recommend weight loss.   Try to decrease calories, sugars and starches. Continue to lose weight.  You decline a colonoscopy.  Let me know if you change your mind about his.  I recommend that you get the RSV shot at the pharmacy.  I will start you on Ozempic for your diabetes. Return in three months for a recheck on the Ozempic.    Your INR was 1.5 today.   This is low but we just increased your coumadin dose a small amount.  Recheck  your INR in one more week.

## 2025-01-31 NOTE — PATIENT INSTRUCTIONS
I ordered labs to be done fasting at [address]  I recommend weight loss.   Try to decrease calories, sugars and starches. Continue to lose weight.  You decline a colonoscopy.  Let me know if you change your mind about his.  I recommend that you get the RSV shot at the pharmacy.  I will start you on Ozempic for your diabetes. Return in three months for a recheck on the Ozempic.    Your INR was 1.5 today.   This is low but we just increased your coumadin dose a small amount.  Recheck your INR in one more week.

## 2025-02-12 LAB
ALBUMIN SERPL-MCNC: 4.5 G/DL (ref 3.6–5.1)
ALP SERPL-CCNC: 32 U/L (ref 35–144)
ALT SERPL-CCNC: 13 U/L (ref 9–46)
ANION GAP SERPL CALCULATED.4IONS-SCNC: 6 MMOL/L (CALC) (ref 7–17)
AST SERPL-CCNC: 21 U/L (ref 10–35)
BASOPHILS # BLD AUTO: 40 CELLS/UL (ref 0–200)
BASOPHILS NFR BLD AUTO: 0.5 %
BILIRUB SERPL-MCNC: 0.6 MG/DL (ref 0.2–1.2)
BUN SERPL-MCNC: 25 MG/DL (ref 7–25)
CALCIUM SERPL-MCNC: 9.5 MG/DL (ref 8.6–10.3)
CHLORIDE SERPL-SCNC: 97 MMOL/L (ref 98–110)
CHOLEST SERPL-MCNC: 170 MG/DL
CHOLEST/HDLC SERPL: 5 (CALC)
CO2 SERPL-SCNC: 33 MMOL/L (ref 20–32)
CREAT SERPL-MCNC: 1.03 MG/DL (ref 0.7–1.28)
EGFRCR SERPLBLD CKD-EPI 2021: 74 ML/MIN/1.73M2
EOSINOPHIL # BLD AUTO: 352 CELLS/UL (ref 15–500)
EOSINOPHIL NFR BLD AUTO: 4.4 %
ERYTHROCYTE [DISTWIDTH] IN BLOOD BY AUTOMATED COUNT: 12.8 % (ref 11–15)
EST. AVERAGE GLUCOSE BLD GHB EST-MCNC: 126 MG/DL
EST. AVERAGE GLUCOSE BLD GHB EST-SCNC: 7 MMOL/L
GLUCOSE SERPL-MCNC: 106 MG/DL (ref 65–99)
HBA1C MFR BLD: 6 % OF TOTAL HGB
HCT VFR BLD AUTO: 49.1 % (ref 38.5–50)
HDLC SERPL-MCNC: 34 MG/DL
HGB BLD-MCNC: 16 G/DL (ref 13.2–17.1)
LDLC SERPL CALC-MCNC: 95 MG/DL (CALC)
LYMPHOCYTES # BLD AUTO: 800 CELLS/UL (ref 850–3900)
LYMPHOCYTES NFR BLD AUTO: 10 %
MCH RBC QN AUTO: 30.3 PG (ref 27–33)
MCHC RBC AUTO-ENTMCNC: 32.6 G/DL (ref 32–36)
MCV RBC AUTO: 93 FL (ref 80–100)
MONOCYTES # BLD AUTO: 808 CELLS/UL (ref 200–950)
MONOCYTES NFR BLD AUTO: 10.1 %
NEUTROPHILS # BLD AUTO: 6000 CELLS/UL (ref 1500–7800)
NEUTROPHILS NFR BLD AUTO: 75 %
NONHDLC SERPL-MCNC: 136 MG/DL (CALC)
PLATELET # BLD AUTO: 216 THOUSAND/UL (ref 140–400)
PMV BLD REES-ECKER: 9.2 FL (ref 7.5–12.5)
POTASSIUM SERPL-SCNC: 4.9 MMOL/L (ref 3.5–5.3)
PROT SERPL-MCNC: 6.8 G/DL (ref 6.1–8.1)
PSA SERPL-MCNC: 1.06 NG/ML
RBC # BLD AUTO: 5.28 MILLION/UL (ref 4.2–5.8)
SODIUM SERPL-SCNC: 136 MMOL/L (ref 135–146)
TRIGL SERPL-MCNC: 286 MG/DL
WBC # BLD AUTO: 8 THOUSAND/UL (ref 3.8–10.8)

## 2025-02-18 ENCOUNTER — TELEPHONE (OUTPATIENT)
Dept: PRIMARY CARE | Facility: CLINIC | Age: 80
End: 2025-02-18
Payer: MEDICARE

## 2025-02-18 DIAGNOSIS — B37.2 CUTANEOUS CANDIDIASIS: Primary | ICD-10-CM

## 2025-02-18 RX ORDER — NYSTATIN 100000 [USP'U]/G
1 POWDER TOPICAL 2 TIMES DAILY
Qty: 60 G | Refills: 0 | Status: SHIPPED | OUTPATIENT
Start: 2025-02-18 | End: 2026-02-18

## 2025-02-18 NOTE — TELEPHONE ENCOUNTER
I called patient and recommended a continued low carb diet.  Increased exercise.  Decreased portions and weight loss.     His INR was low on 2/8/25.  Continue present coumadin and recheck INR tomorrow. He agrees.

## 2025-02-22 ENCOUNTER — PATIENT MESSAGE (OUTPATIENT)
Dept: PRIMARY CARE | Facility: CLINIC | Age: 80
End: 2025-02-22
Payer: MEDICARE

## 2025-02-22 DIAGNOSIS — I10 ESSENTIAL HYPERTENSION: ICD-10-CM

## 2025-02-23 ASSESSMENT — DERMATOLOGY QUALITY OF LIFE (QOL) ASSESSMENT
RATE HOW BOTHERED YOU ARE BY EFFECTS OF YOUR SKIN PROBLEMS ON YOUR ACTIVITIES (EG, GOING OUT, ACCOMPLISHING WHAT YOU WANT, WORK ACTIVITIES OR YOUR RELATIONSHIPS WITH OTHERS): 0 - NEVER BOTHERED
WHAT SINGLE SKIN CONDITION LISTED BELOW IS THE PATIENT ANSWERING THE QUALITY-OF-LIFE ASSESSMENT QUESTIONS ABOUT: PSORIASIS
DATE THE QUALITY-OF-LIFE ASSESSMENT WAS COMPLETED: 67256
RATE HOW BOTHERED YOU ARE BY SYMPTOMS OF YOUR SKIN PROBLEM (EG, ITCHING, STINGING BURNING, HURTING OR SKIN IRRITATION): 0 - NEVER BOTHERED
RATE HOW EMOTIONALLY BOTHERED YOU ARE BY YOUR SKIN PROBLEM (FOR EXAMPLE, WORRY, EMBARRASSMENT, FRUSTRATION): 0 - NEVER BOTHERED
RATE HOW BOTHERED YOU ARE BY EFFECTS OF YOUR SKIN PROBLEMS ON YOUR ACTIVITIES (EG, GOING OUT, ACCOMPLISHING WHAT YOU WANT, WORK ACTIVITIES OR YOUR RELATIONSHIPS WITH OTHERS): 0 - NEVER BOTHERED
RATE HOW BOTHERED YOU ARE BY SYMPTOMS OF YOUR SKIN PROBLEM (EG, ITCHING, STINGING BURNING, HURTING OR SKIN IRRITATION): 0 - NEVER BOTHERED
WHAT SINGLE SKIN CONDITION LISTED BELOW IS THE PATIENT ANSWERING THE QUALITY-OF-LIFE ASSESSMENT QUESTIONS ABOUT: PSORIASIS
RATE HOW EMOTIONALLY BOTHERED YOU ARE BY YOUR SKIN PROBLEM (FOR EXAMPLE, WORRY, EMBARRASSMENT, FRUSTRATION): 0 - NEVER BOTHERED

## 2025-02-24 DIAGNOSIS — I10 ESSENTIAL HYPERTENSION: ICD-10-CM

## 2025-02-24 RX ORDER — VENLAFAXINE HYDROCHLORIDE 150 MG/1
150 CAPSULE, EXTENDED RELEASE ORAL DAILY
Qty: 90 CAPSULE | Refills: 3 | Status: SHIPPED | OUTPATIENT
Start: 2025-02-24

## 2025-02-27 ENCOUNTER — APPOINTMENT (OUTPATIENT)
Dept: DERMATOLOGY | Facility: CLINIC | Age: 80
End: 2025-02-27
Payer: MEDICARE

## 2025-02-27 NOTE — PROGRESS NOTES
Subjective     Silvestre Aranda is a 80 y.o. male who presents for the following: Psoriasis (Follow up for Psoriasis Vulgaris. Pt currently using Skyrizi and Clobetasol Cream. Pt reports ).     Review of Systems:  No other skin or systemic complaints other than what is documented elsewhere in the note.    The following portions of the chart were reviewed this encounter and updated as appropriate:          Skin Cancer History  No skin cancer on file.      Specialty Problems          Dermatology Problems    Onychomycosis of toenail    Psoriasis    Skin lesion of face    Tinea cruris    Eczema        Objective   Well appearing patient in no apparent distress; mood and affect are within normal limits.    A focused skin examination was performed. All findings within normal limits unless otherwise noted below.    Assessment/Plan

## 2025-03-05 DIAGNOSIS — I10 ESSENTIAL HYPERTENSION: ICD-10-CM

## 2025-03-06 RX ORDER — VENLAFAXINE HYDROCHLORIDE 150 MG/1
150 CAPSULE, EXTENDED RELEASE ORAL DAILY
Qty: 90 CAPSULE | Refills: 3 | Status: SHIPPED | OUTPATIENT
Start: 2025-03-06

## 2025-03-10 ENCOUNTER — APPOINTMENT (OUTPATIENT)
Dept: DERMATOLOGY | Facility: CLINIC | Age: 80
End: 2025-03-10
Payer: MEDICARE

## 2025-03-10 DIAGNOSIS — L40.0 PSORIASIS VULGARIS: Primary | ICD-10-CM

## 2025-03-10 PROCEDURE — 1157F ADVNC CARE PLAN IN RCRD: CPT | Performed by: NURSE PRACTITIONER

## 2025-03-10 PROCEDURE — 1123F ACP DISCUSS/DSCN MKR DOCD: CPT | Performed by: NURSE PRACTITIONER

## 2025-03-10 PROCEDURE — 99213 OFFICE O/P EST LOW 20 MIN: CPT | Performed by: NURSE PRACTITIONER

## 2025-03-10 PROCEDURE — 1036F TOBACCO NON-USER: CPT | Performed by: NURSE PRACTITIONER

## 2025-03-10 PROCEDURE — 1159F MED LIST DOCD IN RCRD: CPT | Performed by: NURSE PRACTITIONER

## 2025-03-10 ASSESSMENT — DERMATOLOGY QUALITY OF LIFE (QOL) ASSESSMENT
RATE HOW EMOTIONALLY BOTHERED YOU ARE BY YOUR SKIN PROBLEM (FOR EXAMPLE, WORRY, EMBARRASSMENT, FRUSTRATION): 0 - NEVER BOTHERED
DATE THE QUALITY-OF-LIFE ASSESSMENT WAS COMPLETED: 67279
RATE HOW BOTHERED YOU ARE BY SYMPTOMS OF YOUR SKIN PROBLEM (EG, ITCHING, STINGING BURNING, HURTING OR SKIN IRRITATION): 0 - NEVER BOTHERED
RATE HOW EMOTIONALLY BOTHERED YOU ARE BY YOUR SKIN PROBLEM (FOR EXAMPLE, WORRY, EMBARRASSMENT, FRUSTRATION): 0 - NEVER BOTHERED
RATE HOW BOTHERED YOU ARE BY SYMPTOMS OF YOUR SKIN PROBLEM (EG, ITCHING, STINGING BURNING, HURTING OR SKIN IRRITATION): 0 - NEVER BOTHERED
RATE HOW BOTHERED YOU ARE BY EFFECTS OF YOUR SKIN PROBLEMS ON YOUR ACTIVITIES (EG, GOING OUT, ACCOMPLISHING WHAT YOU WANT, WORK ACTIVITIES OR YOUR RELATIONSHIPS WITH OTHERS): 0 - NEVER BOTHERED
RATE HOW BOTHERED YOU ARE BY EFFECTS OF YOUR SKIN PROBLEMS ON YOUR ACTIVITIES (EG, GOING OUT, ACCOMPLISHING WHAT YOU WANT, WORK ACTIVITIES OR YOUR RELATIONSHIPS WITH OTHERS): 0 - NEVER BOTHERED
WHAT SINGLE SKIN CONDITION LISTED BELOW IS THE PATIENT ANSWERING THE QUALITY-OF-LIFE ASSESSMENT QUESTIONS ABOUT: NONE OF THE ABOVE
WHAT SINGLE SKIN CONDITION LISTED BELOW IS THE PATIENT ANSWERING THE QUALITY-OF-LIFE ASSESSMENT QUESTIONS ABOUT: NONE OF THE ABOVE

## 2025-03-10 ASSESSMENT — PATIENT GLOBAL ASSESSMENT (PGA): WHAT IS THE PGA: PATIENT GLOBAL ASSESSMENT:  1 - CLEAR

## 2025-03-10 NOTE — PROGRESS NOTES
Subjective     Silvestre Aranda is a 80 y.o. male who presents for the following: Psoriasis and Seborrheic Dermatitis.   Established patient in for follow up last seen 08/2024 and was prescribed to use ketoconazole shampoo 2%, skyrizi, clobetasol 0.05% cream.   Patient is due for T-spot.     Review of Systems:  No other skin or systemic complaints other than what is documented elsewhere in the note.    The following portions of the chart were reviewed this encounter and updated as appropriate:       Skin Cancer History      Specialty Problems          Dermatology Problems    Onychomycosis of toenail    Psoriasis    Skin lesion of face    Tinea cruris    Eczema     Past Medical History:  Silvestre Aranda  has a past medical history of Arthritis, Basal cell carcinoma (03/2020), Cold sore, Crohn disease (Multi), Depression, Eczema, Headache (1969), Hyperlipidemia, unspecified, Hypertension, Other conditions influencing health status, Other conditions influencing health status, Psoriasis, PTSD (post-traumatic stress disorder), Sleep disorder, Unspecified atrial fibrillation (Multi), and Vascular disease.    Past Surgical History:  Silvestre Aranda  has a past surgical history that includes Total hip arthroplasty (04/28/2014); Hernia repair (04/28/2014); Other surgical history (08/27/2013); and Aortic valve replacement (08/27/2013).    Family History:  Patient family history includes Colon cancer in his mother; Heart disease in his father.    Social History:  Silvestre Aranda  reports that he quit smoking about 20 years ago. His smoking use included cigarettes. He has a 57 pack-year smoking history. He has never used smokeless tobacco. He reports that he does not currently use alcohol. He reports that he does not currently use drugs after having used the following drugs: Hashish.    Allergies:  Erythromycin and Scallops    Current Medications / CAM's:    Current Outpatient Medications:     acetaminophen (TylenoL) 325 mg tablet, Take  2 tablets (650 mg) by mouth every 6 hours., Disp: , Rfl:     amLODIPine (Norvasc) 5 mg tablet, TAKE 1 TABLET BY MOUTH ONCE  DAILY, Disp: 100 tablet, Rfl: 2    atorvastatin (Lipitor) 20 mg tablet, Take 1 tablet (20 mg) by mouth once daily., Disp: 100 tablet, Rfl: 0    fenofibrate (Tricor) 145 mg tablet, TAKE 1 TABLET BY MOUTH ONCE  DAILY, Disp: 100 tablet, Rfl: 2    gabapentin (Neurontin) 600 mg tablet, TAKE 2 TABLETS BY MOUTH EVERY  MORNING AND 2 TABLETS BY MOUTH  AT NIGHT, Disp: 360 tablet, Rfl: 3    lisinopril 40 mg tablet, TAKE 1 TABLET BY MOUTH ONCE  DAILY, Disp: 100 tablet, Rfl: 2    metFORMIN (Glucophage) 500 mg tablet, Take 1 tablet (500 mg) by mouth 2 times daily (morning and late afternoon)., Disp: 200 tablet, Rfl: 2    metoprolol succinate XL (Toprol-XL) 100 mg 24 hr tablet, TAKE 1 TABLET BY MOUTH ONCE  DAILY (DO NOT CRUSH OR CHEW), Disp: 100 tablet, Rfl: 2    nystatin (Mycostatin) 100,000 unit/gram powder, Apply 1 Application topically 2 times a day., Disp: 60 g, Rfl: 0    risankizumab-rzaa (Skyrizi) 150 mg/mL pen injector pen, Inject 1 mL (150 mg) under the skin every 12 weeks., Disp: 1 mL, Rfl: 0    semaglutide 0.25 mg or 0.5 mg (2 mg/3 mL) pen injector, Inject 0.25 mg under the skin 1 (one) time per week., Disp: 6 mL, Rfl: 1    sertraline (Zoloft) 50 mg tablet, TAKE 1 TABLET BY MOUTH ONCE  DAILY, Disp: 90 tablet, Rfl: 3    sotalol (Betapace) 80 mg tablet, TAKE 1 TABLET BY MOUTH TWICE  DAILY, Disp: 200 tablet, Rfl: 2    tamsulosin (Flomax) 0.4 mg 24 hr capsule, TAKE 1 CAPSULE BY MOUTH ONCE  DAILY, Disp: 100 capsule, Rfl: 2    venlafaxine XR (Effexor-XR) 150 mg 24 hr capsule, Take 1 capsule (150 mg) by mouth once daily. With a meal, Disp: 90 capsule, Rfl: 3    venlafaxine XR (Effexor-XR) 150 mg 24 hr capsule, Take 1 capsule (150 mg) by mouth once daily. With a meal, Disp: 90 capsule, Rfl: 3    warfarin (Coumadin) 1 mg tablet, Take as directed per After Visit Summary. As of 1/23/25: take 3 mg q STTHS and  2 mg q MWF, Disp: , Rfl:      Objective   Well appearing patient in no apparent distress; mood and affect are within normal limits.      Assessment/Plan   1. Psoriasis vulgaris  Clear on exam.    PLAN:  Continue Skyrizi 150mg every 12 weeks  Quantiferon ordered today, no new script will be sent without result   Can use clobetasol 0.05% cream twice daily    Related Medications  risankizumab-rzaa (Skyrizi) 150 mg/mL pen injector pen  Inject 1 mL (150 mg) under the skin every 12 weeks.          DC instructions

## 2025-03-12 LAB
NON-UH HIE QUANTIFERON MITOGEN MINUS NIL: 9.93 IU/ML
NON-UH HIE QUANTIFERON NIL: 0.07 IU/ML
NON-UH HIE QUANTIFERON PLUS TB1 MINUS NIL: 0 IU/ML
NON-UH HIE QUANTIFERON PLUS TB2 MINUS NIL: 0 IU/ML
NON-UH HIE QUANTIFERON TB GOLD PLUS: NEGATIVE

## 2025-03-13 DIAGNOSIS — L40.0 PSORIASIS VULGARIS: ICD-10-CM

## 2025-03-27 DIAGNOSIS — E78.5 HYPERLIPIDEMIA, UNSPECIFIED HYPERLIPIDEMIA TYPE: ICD-10-CM

## 2025-03-27 RX ORDER — ATORVASTATIN CALCIUM 20 MG/1
20 TABLET, FILM COATED ORAL DAILY
Qty: 100 TABLET | Refills: 2 | Status: SHIPPED | OUTPATIENT
Start: 2025-03-27

## 2025-04-04 ENCOUNTER — PATIENT MESSAGE (OUTPATIENT)
Dept: PRIMARY CARE | Facility: CLINIC | Age: 80
End: 2025-04-04
Payer: MEDICARE

## 2025-04-14 DIAGNOSIS — E11.9 TYPE 2 DIABETES MELLITUS WITHOUT COMPLICATION, WITHOUT LONG-TERM CURRENT USE OF INSULIN: Primary | ICD-10-CM

## 2025-04-18 NOTE — PROGRESS NOTES
Spoke with pt.  He held coumadin yesterday.  INR is 2.6 today.  I recommend that he hold the coumadin today and then restart the coumadin tomorrow at a lower dose of 2 mg daily.  Recheck INR on Monday 4/21/25.

## 2025-05-01 ENCOUNTER — APPOINTMENT (OUTPATIENT)
Dept: PRIMARY CARE | Facility: CLINIC | Age: 80
End: 2025-05-01
Payer: MEDICARE

## 2025-05-01 VITALS
DIASTOLIC BLOOD PRESSURE: 64 MMHG | BODY MASS INDEX: 38.42 KG/M2 | WEIGHT: 249 LBS | TEMPERATURE: 97.5 F | SYSTOLIC BLOOD PRESSURE: 130 MMHG

## 2025-05-01 DIAGNOSIS — E11.9 TYPE 2 DIABETES MELLITUS WITHOUT COMPLICATION, WITHOUT LONG-TERM CURRENT USE OF INSULIN: ICD-10-CM

## 2025-05-01 DIAGNOSIS — Z95.2 S/P AORTIC VALVE REPLACEMENT: ICD-10-CM

## 2025-05-01 DIAGNOSIS — I48.0 PAROXYSMAL ATRIAL FIBRILLATION (MULTI): ICD-10-CM

## 2025-05-01 DIAGNOSIS — I11.0 HYPERTENSIVE HEART DISEASE WITH HEART FAILURE: ICD-10-CM

## 2025-05-01 DIAGNOSIS — F33.1 MODERATE EPISODE OF RECURRENT MAJOR DEPRESSIVE DISORDER: ICD-10-CM

## 2025-05-01 DIAGNOSIS — I10 ESSENTIAL HYPERTENSION: Primary | ICD-10-CM

## 2025-05-01 PROCEDURE — 3078F DIAST BP <80 MM HG: CPT | Performed by: FAMILY MEDICINE

## 2025-05-01 PROCEDURE — 1159F MED LIST DOCD IN RCRD: CPT | Performed by: FAMILY MEDICINE

## 2025-05-01 PROCEDURE — G2211 COMPLEX E/M VISIT ADD ON: HCPCS | Performed by: FAMILY MEDICINE

## 2025-05-01 PROCEDURE — 3075F SYST BP GE 130 - 139MM HG: CPT | Performed by: FAMILY MEDICINE

## 2025-05-01 PROCEDURE — 1123F ACP DISCUSS/DSCN MKR DOCD: CPT | Performed by: FAMILY MEDICINE

## 2025-05-01 PROCEDURE — 1160F RVW MEDS BY RX/DR IN RCRD: CPT | Performed by: FAMILY MEDICINE

## 2025-05-01 PROCEDURE — 1036F TOBACCO NON-USER: CPT | Performed by: FAMILY MEDICINE

## 2025-05-01 PROCEDURE — 99214 OFFICE O/P EST MOD 30 MIN: CPT | Performed by: FAMILY MEDICINE

## 2025-05-01 PROCEDURE — 1157F ADVNC CARE PLAN IN RCRD: CPT | Performed by: FAMILY MEDICINE

## 2025-05-01 NOTE — PATIENT INSTRUCTIONS
Continue your same medications.  Call if refills are needed.  I will increase your coumadin to 3 mg every Monday and Thursday and 2 mg all the other days (Sun, Tue, Wed, Fri and Sat).  Recheck your INR in one week.

## 2025-05-01 NOTE — PROGRESS NOTES
Subjective   Patient ID: [id number]     Silvestre Aranda is a 80 y.o. male who presents for Follow-up (3 month).  HPI  He is here for a recheck.      He has a history of aortic valve replacement and is on coumadin. D/t a history of serious retroperitoneal bleed, his goal INR is 1.9 to 2.5.  Today's INR is 1.1.       He has HTN, a fib, depression, low back pain, diabetes, BPH,     BP is 130/64.  Weight is down nine pounds.  BMI 38.    He denies chest pain, SOB, dizziness, bleeding anywhere.  No abdominal pain.      He is a little sleepy because he stays up and watches television late at night.    He has been losing weight on the Ozempic.          Objective     /64   Temp 36.4 °C (97.5 °F) (Skin)   Wt 113 kg (249 lb)   BMI 38.42 kg/m²      Physical Exam  Constitutional:       Appearance: Normal appearance.   Cardiovascular:      Rate and Rhythm: Normal rate and regular rhythm.      Heart sounds: Normal heart sounds. No murmur heard.  Pulmonary:      Effort: Pulmonary effort is normal. No respiratory distress.      Breath sounds: Normal breath sounds.   Abdominal:      General: Abdomen is flat.      Palpations: Abdomen is soft.      Tenderness: There is no abdominal tenderness. There is no guarding or rebound.   Neurological:      General: No focal deficit present.      Mental Status: He is alert and oriented to person, place, and time. Mental status is at baseline.      Comments: Alert, conversive.  No confusion.     Psychiatric:         Mood and Affect: Mood normal.         Assessment/Plan   Problem List Items Addressed This Visit       Essential hypertension - Primary    Paroxysmal atrial fibrillation (Multi)    Moderate episode of recurrent major depressive disorder    Hypertensive heart disease with heart failure    Type 2 diabetes mellitus without complication, without long-term current use of insulin     Other Visit Diagnoses         S/P aortic valve replacement            Continue your same medications.   Call if refills are needed.  I will increase your coumadin to 3 mg every Monday and Thursday and 2 mg all the other days (Sun, Tue, Wed, Fri and Sat).          Lexa Garcia, DO

## 2025-05-08 ENCOUNTER — TELEPHONE (OUTPATIENT)
Dept: PRIMARY CARE | Facility: CLINIC | Age: 80
End: 2025-05-08
Payer: MEDICARE

## 2025-05-16 ENCOUNTER — TELEPHONE (OUTPATIENT)
Dept: PRIMARY CARE | Facility: CLINIC | Age: 80
End: 2025-05-16
Payer: MEDICARE

## 2025-05-22 DIAGNOSIS — I48.0 PAROXYSMAL ATRIAL FIBRILLATION (MULTI): ICD-10-CM

## 2025-05-22 RX ORDER — WARFARIN 1 MG/1
3 TABLET ORAL EVERY EVENING
Qty: 270 TABLET | Refills: 0 | Status: SHIPPED | OUTPATIENT
Start: 2025-05-22

## 2025-06-04 DIAGNOSIS — M48.061 SPINAL STENOSIS OF LUMBAR REGION, UNSPECIFIED WHETHER NEUROGENIC CLAUDICATION PRESENT: ICD-10-CM

## 2025-06-05 RX ORDER — GABAPENTIN 600 MG/1
TABLET ORAL
Qty: 400 TABLET | Refills: 2 | Status: SHIPPED | OUTPATIENT
Start: 2025-06-05

## 2025-06-11 DIAGNOSIS — E11.9 TYPE 2 DIABETES MELLITUS WITHOUT COMPLICATION, WITHOUT LONG-TERM CURRENT USE OF INSULIN: ICD-10-CM

## 2025-06-11 RX ORDER — SEMAGLUTIDE 1.34 MG/ML
1 INJECTION, SOLUTION SUBCUTANEOUS
Qty: 9 ML | Refills: 3 | Status: SHIPPED | OUTPATIENT
Start: 2025-06-15

## 2025-06-20 ENCOUNTER — TELEPHONE (OUTPATIENT)
Dept: PRIMARY CARE | Facility: CLINIC | Age: 80
End: 2025-06-20
Payer: MEDICARE

## 2025-07-03 DIAGNOSIS — E11.9 TYPE 2 DIABETES MELLITUS WITHOUT COMPLICATION, WITHOUT LONG-TERM CURRENT USE OF INSULIN: ICD-10-CM

## 2025-07-03 RX ORDER — SEMAGLUTIDE 1.7 MG/.75ML
1.7 INJECTION, SOLUTION SUBCUTANEOUS WEEKLY
Qty: 9 ML | Refills: 0 | Status: SHIPPED | OUTPATIENT
Start: 2025-07-03 | End: 2025-07-25

## 2025-07-15 ENCOUNTER — TELEPHONE (OUTPATIENT)
Dept: PRIMARY CARE | Facility: CLINIC | Age: 80
End: 2025-07-15
Payer: MEDICARE

## 2025-07-15 DIAGNOSIS — I10 ESSENTIAL HYPERTENSION: ICD-10-CM

## 2025-07-15 DIAGNOSIS — I10 PRIMARY HYPERTENSION: ICD-10-CM

## 2025-07-15 DIAGNOSIS — E11.9 TYPE 2 DIABETES MELLITUS WITHOUT COMPLICATION, WITHOUT LONG-TERM CURRENT USE OF INSULIN: ICD-10-CM

## 2025-07-15 DIAGNOSIS — I48.0 PAROXYSMAL ATRIAL FIBRILLATION (MULTI): ICD-10-CM

## 2025-07-15 DIAGNOSIS — E78.5 HYPERLIPIDEMIA, UNSPECIFIED HYPERLIPIDEMIA TYPE: ICD-10-CM

## 2025-07-15 RX ORDER — SEMAGLUTIDE 1.34 MG/ML
1 INJECTION, SOLUTION SUBCUTANEOUS
Qty: 9 ML | Refills: 3 | Status: SHIPPED | OUTPATIENT
Start: 2025-07-15

## 2025-07-15 RX ORDER — LISINOPRIL 40 MG/1
40 TABLET ORAL DAILY
Qty: 100 TABLET | Refills: 2 | Status: SHIPPED | OUTPATIENT
Start: 2025-07-15

## 2025-07-15 RX ORDER — WARFARIN 1 MG/1
3 TABLET ORAL EVERY EVENING
Qty: 270 TABLET | Refills: 0 | Status: SHIPPED | OUTPATIENT
Start: 2025-07-15

## 2025-07-15 RX ORDER — FENOFIBRATE 145 MG/1
145 TABLET, FILM COATED ORAL DAILY
Qty: 100 TABLET | Refills: 2 | Status: SHIPPED | OUTPATIENT
Start: 2025-07-15

## 2025-07-15 RX ORDER — METOPROLOL SUCCINATE 100 MG/1
100 TABLET, EXTENDED RELEASE ORAL DAILY
Qty: 100 TABLET | Refills: 2 | Status: SHIPPED | OUTPATIENT
Start: 2025-07-15

## 2025-07-15 NOTE — TELEPHONE ENCOUNTER
REFILL  MEDICATION:     Fenofibrate 145 MG; Take 1 tablet daily.    LR: 12/23/24     100 tablets with 2 refills     Metoprolol Succinate  MG; Take 1 tablet daily.    LR: 12/23/24    100 tablets with 2 refills    Warfarin 1 MG; Take 3 tablets daily in the evening.    LR: 5/22/25      270 tablets with 0 refills     Lisinopril 40 MG; Take 1 tablet daily.    LR: 11/1/24      100 tablets with 2 refills     PHARM: [facility]    LV: 5/1/25  NV: 8/1/25

## 2025-07-15 NOTE — TELEPHONE ENCOUNTER
Pt is asking if you can give him a call? Pt stated that he had a question about if he is suppose to be on Wegovy or Ozempic?

## 2025-08-01 ENCOUNTER — APPOINTMENT (OUTPATIENT)
Dept: PRIMARY CARE | Facility: CLINIC | Age: 80
End: 2025-08-01
Payer: MEDICARE

## 2025-08-05 ENCOUNTER — TELEPHONE (OUTPATIENT)
Dept: PRIMARY CARE | Facility: CLINIC | Age: 80
End: 2025-08-05
Payer: MEDICARE

## 2025-08-05 DIAGNOSIS — I48.0 PAROXYSMAL ATRIAL FIBRILLATION (MULTI): ICD-10-CM

## 2025-08-05 RX ORDER — WARFARIN 1 MG/1
3 TABLET ORAL EVERY EVENING
Qty: 270 TABLET | Refills: 0 | Status: SHIPPED | OUTPATIENT
Start: 2025-08-05

## 2025-08-05 NOTE — TELEPHONE ENCOUNTER
Pt called stating that he never received his Warfarin from [facility]. Pt is asking if you can resend this to [facility] in [place]?     Medication:     Warfarin 1 MG; Take 3 tablets daily in the evening.     Pharmacy: [facility]   Phone Number: [phone]    LR: 7/15/25      270 tablets with 0 refills